# Patient Record
Sex: FEMALE | Race: WHITE | Employment: UNEMPLOYED | URBAN - METROPOLITAN AREA
[De-identification: names, ages, dates, MRNs, and addresses within clinical notes are randomized per-mention and may not be internally consistent; named-entity substitution may affect disease eponyms.]

---

## 2021-04-06 ENCOUNTER — HOSPITAL ENCOUNTER (INPATIENT)
Age: 36
LOS: 4 days | Discharge: HOME | DRG: 540 | End: 2021-04-10
Attending: OBSTETRICS & GYNECOLOGY | Admitting: OBSTETRICS & GYNECOLOGY
Payer: MEDICARE

## 2021-04-06 DIAGNOSIS — Z98.890 POST-OPERATIVE STATE: Primary | ICD-10-CM

## 2021-04-06 DIAGNOSIS — Z98.890 POST-OPERATIVE STATE: ICD-10-CM

## 2021-04-06 PROBLEM — O09.90 HIGH RISK PREGNANCY, ANTEPARTUM: Status: ACTIVE | Noted: 2021-04-06

## 2021-04-06 LAB
ABO/RH: NORMAL
ABSOLUTE EOS #: <0.03 K/UL (ref 0–0.44)
ABSOLUTE IMMATURE GRANULOCYTE: 0.41 K/UL (ref 0–0.3)
ABSOLUTE LYMPH #: 0.95 K/UL (ref 1.1–3.7)
ABSOLUTE MONO #: 0.15 K/UL (ref 0.1–1.2)
ANTIBODY SCREEN: NEGATIVE
ARM BAND NUMBER: NORMAL
BASOPHILS # BLD: 0 % (ref 0–2)
BASOPHILS ABSOLUTE: 0.04 K/UL (ref 0–0.2)
DIFFERENTIAL TYPE: ABNORMAL
EOSINOPHILS RELATIVE PERCENT: 0 % (ref 1–4)
EXPIRATION DATE: NORMAL
HCT VFR BLD CALC: 34 % (ref 36.3–47.1)
HEMOGLOBIN: 11.6 G/DL (ref 11.9–15.1)
IMMATURE GRANULOCYTES: 2 %
LYMPHOCYTES # BLD: 6 % (ref 24–43)
MAGNESIUM: 4.5 MG/DL (ref 1.6–2.6)
MAGNESIUM: 5.6 MG/DL (ref 1.6–2.6)
MCH RBC QN AUTO: 32.7 PG (ref 25.2–33.5)
MCHC RBC AUTO-ENTMCNC: 34.1 G/DL (ref 28.4–34.8)
MCV RBC AUTO: 95.8 FL (ref 82.6–102.9)
MONOCYTES # BLD: 1 % (ref 3–12)
NRBC AUTOMATED: 0 PER 100 WBC
PDW BLD-RTO: 12.5 % (ref 11.8–14.4)
PLATELET # BLD: 315 K/UL (ref 138–453)
PLATELET ESTIMATE: ABNORMAL
PMV BLD AUTO: 9.1 FL (ref 8.1–13.5)
RBC # BLD: 3.55 M/UL (ref 3.95–5.11)
RBC # BLD: ABNORMAL 10*6/UL
SEG NEUTROPHILS: 91 % (ref 36–65)
SEGMENTED NEUTROPHILS ABSOLUTE COUNT: 15.77 K/UL (ref 1.5–8.1)
T. PALLIDUM, IGG: NONREACTIVE
WBC # BLD: 17.3 K/UL (ref 3.5–11.3)
WBC # BLD: ABNORMAL 10*3/UL

## 2021-04-06 PROCEDURE — 86780 TREPONEMA PALLIDUM: CPT

## 2021-04-06 PROCEDURE — 6360000002 HC RX W HCPCS: Performed by: STUDENT IN AN ORGANIZED HEALTH CARE EDUCATION/TRAINING PROGRAM

## 2021-04-06 PROCEDURE — 86850 RBC ANTIBODY SCREEN: CPT

## 2021-04-06 PROCEDURE — 85025 COMPLETE CBC W/AUTO DIFF WBC: CPT

## 2021-04-06 PROCEDURE — 6370000000 HC RX 637 (ALT 250 FOR IP): Performed by: STUDENT IN AN ORGANIZED HEALTH CARE EDUCATION/TRAINING PROGRAM

## 2021-04-06 PROCEDURE — 86901 BLOOD TYPING SEROLOGIC RH(D): CPT

## 2021-04-06 PROCEDURE — 1220000000 HC SEMI PRIVATE OB R&B

## 2021-04-06 PROCEDURE — 2580000003 HC RX 258: Performed by: STUDENT IN AN ORGANIZED HEALTH CARE EDUCATION/TRAINING PROGRAM

## 2021-04-06 PROCEDURE — 86900 BLOOD TYPING SEROLOGIC ABO: CPT

## 2021-04-06 PROCEDURE — 83735 ASSAY OF MAGNESIUM: CPT

## 2021-04-06 PROCEDURE — 36415 COLL VENOUS BLD VENIPUNCTURE: CPT

## 2021-04-06 PROCEDURE — 6360000002 HC RX W HCPCS

## 2021-04-06 PROCEDURE — 81001 URINALYSIS AUTO W/SCOPE: CPT

## 2021-04-06 PROCEDURE — 99221 1ST HOSP IP/OBS SF/LOW 40: CPT | Performed by: OBSTETRICS & GYNECOLOGY

## 2021-04-06 RX ORDER — METOPROLOL SUCCINATE 25 MG/1
25 TABLET, EXTENDED RELEASE ORAL DAILY
COMMUNITY

## 2021-04-06 RX ORDER — AZITHROMYCIN 250 MG/1
500 TABLET, FILM COATED ORAL DAILY
Status: DISCONTINUED | OUTPATIENT
Start: 2021-04-08 | End: 2021-04-08

## 2021-04-06 RX ORDER — ONDANSETRON 2 MG/ML
4 INJECTION INTRAMUSCULAR; INTRAVENOUS EVERY 6 HOURS PRN
Status: DISCONTINUED | OUTPATIENT
Start: 2021-04-06 | End: 2021-04-08

## 2021-04-06 RX ORDER — AMOXICILLIN 250 MG/1
250 CAPSULE ORAL EVERY 8 HOURS SCHEDULED
Status: DISCONTINUED | OUTPATIENT
Start: 2021-04-08 | End: 2021-04-08

## 2021-04-06 RX ORDER — SODIUM CHLORIDE, SODIUM LACTATE, POTASSIUM CHLORIDE, CALCIUM CHLORIDE 600; 310; 30; 20 MG/100ML; MG/100ML; MG/100ML; MG/100ML
INJECTION, SOLUTION INTRAVENOUS CONTINUOUS
Status: DISCONTINUED | OUTPATIENT
Start: 2021-04-06 | End: 2021-04-08

## 2021-04-06 RX ORDER — AZITHROMYCIN 250 MG/1
500 TABLET, FILM COATED ORAL DAILY
Status: DISCONTINUED | OUTPATIENT
Start: 2021-04-08 | End: 2021-04-06

## 2021-04-06 RX ORDER — ACETAMINOPHEN 325 MG/1
650 TABLET ORAL EVERY 4 HOURS PRN
Status: DISCONTINUED | OUTPATIENT
Start: 2021-04-06 | End: 2021-04-08 | Stop reason: ALTCHOICE

## 2021-04-06 RX ORDER — AMOXICILLIN 250 MG/1
250 CAPSULE ORAL EVERY 8 HOURS SCHEDULED
Status: DISCONTINUED | OUTPATIENT
Start: 2021-04-08 | End: 2021-04-06

## 2021-04-06 RX ORDER — BETAMETHASONE SODIUM PHOSPHATE AND BETAMETHASONE ACETATE 3; 3 MG/ML; MG/ML
12 INJECTION, SUSPENSION INTRA-ARTICULAR; INTRALESIONAL; INTRAMUSCULAR; SOFT TISSUE ONCE
Status: COMPLETED | OUTPATIENT
Start: 2021-04-07 | End: 2021-04-07

## 2021-04-06 RX ORDER — LEVOTHYROXINE AND LIOTHYRONINE 19; 4.5 UG/1; UG/1
30 TABLET ORAL DAILY
COMMUNITY

## 2021-04-06 RX ORDER — PROMETHAZINE HYDROCHLORIDE 12.5 MG/1
12.5 TABLET ORAL EVERY 6 HOURS PRN
Status: DISCONTINUED | OUTPATIENT
Start: 2021-04-06 | End: 2021-04-08

## 2021-04-06 RX ORDER — VITAMIN A, ASCORBIC ACID, CHOLECALCIFEROL, .ALPHA.-TOCOPHEROL ACETATE, DL-, THIAMINE MONONITRATE, RIBOFLAVIN, NIACINAMIDE, PYRIDOXINE HYDROCHLORIDE, FOLIC ACID, CYANOCOBALAMIN, CALCIUM CARBONATE, IRON, ZINC OXIDE, AND CUPRIC OXIDE 4000; 120; 400; 22; 1.84; 3; 20; 10; 1; 12; 200; 29; 25; 2 [IU]/1; MG/1; [IU]/1; [IU]/1; MG/1; MG/1; MG/1; MG/1; MG/1; UG/1; MG/1; MG/1; MG/1; MG/1
1 TABLET ORAL DAILY
Status: DISCONTINUED | OUTPATIENT
Start: 2021-04-07 | End: 2021-04-08

## 2021-04-06 RX ORDER — LEVOTHYROXINE AND LIOTHYRONINE 19; 4.5 UG/1; UG/1
30 TABLET ORAL
Status: DISCONTINUED | OUTPATIENT
Start: 2021-04-07 | End: 2021-04-08 | Stop reason: SDUPTHER

## 2021-04-06 RX ADMIN — MAGNESIUM SULFATE HEPTAHYDRATE 2 G/HR: 40 INJECTION, SOLUTION INTRAVENOUS at 14:29

## 2021-04-06 RX ADMIN — MAGNESIUM SULFATE HEPTAHYDRATE 2 G/HR: 40 INJECTION, SOLUTION INTRAVENOUS at 23:52

## 2021-04-06 RX ADMIN — AMPICILLIN SODIUM 2000 MG: 2 INJECTION, POWDER, FOR SOLUTION INTRAMUSCULAR; INTRAVENOUS at 10:34

## 2021-04-06 RX ADMIN — METOPROLOL TARTRATE 25 MG: 25 TABLET ORAL at 11:11

## 2021-04-06 RX ADMIN — SODIUM CHLORIDE, POTASSIUM CHLORIDE, SODIUM LACTATE AND CALCIUM CHLORIDE: 600; 310; 30; 20 INJECTION, SOLUTION INTRAVENOUS at 10:40

## 2021-04-06 RX ADMIN — THYROID, PORCINE 30 MG: 30 TABLET ORAL at 10:27

## 2021-04-06 RX ADMIN — SODIUM CHLORIDE, POTASSIUM CHLORIDE, SODIUM LACTATE AND CALCIUM CHLORIDE: 600; 310; 30; 20 INJECTION, SOLUTION INTRAVENOUS at 23:51

## 2021-04-06 RX ADMIN — AMPICILLIN SODIUM 2000 MG: 2 INJECTION, POWDER, FOR SOLUTION INTRAMUSCULAR; INTRAVENOUS at 16:24

## 2021-04-06 RX ADMIN — AMPICILLIN SODIUM 2000 MG: 2 INJECTION, POWDER, FOR SOLUTION INTRAMUSCULAR; INTRAVENOUS at 23:51

## 2021-04-06 ASSESSMENT — PAIN DESCRIPTION - DESCRIPTORS: DESCRIPTORS: CRAMPING;TIGHTNESS

## 2021-04-06 NOTE — FLOWSHEET NOTE
Pt presents to [facility], accompanied by [employer] team, via stretcher. Pt here for transport from [facility] for [facility] at 31 4/7 wks. Pt verbalizes feeling ctx's about Q 10 mins. IV of LR infusing, Magnesium Sulfate at 2 gm/hr Pt in bed, oriented to room and call light. EFM explained and applied. FHT's 142. Dr. Gloria Guidry aware of admission.

## 2021-04-06 NOTE — DISCHARGE SUMMARY
Obstetric Discharge Summary  [facility]    Patient Name: Luciana Donohue  Patient : 1985  Primary Care Physician: No primary care provider on file. Admit Date: 2021     Principal Diagnosis: IUP at 31w4d, admitted for PPROM      Her pregnancy has been complicated by:   Patient Active Problem List   Diagnosis    High risk pregnancy, antepartum    S/p Celestone  &      premature rupture of membranes (PPROM) with unknown onset of labor    Oligohydramnios, antepartum    Multigravida of advanced maternal age in third trimester    Maternal cardiovascular disease complicating pregnancy in third trimester, antepartum    Tobacco use disorder complicating pregnancy, childbirth, or puerperium, antepartum    PLTCS 21 F Apg  Wt 3#15    Postpartum state       Infection Present?: No  Hospital Acquired: No    Surgical Operations & Procedures:  [] Pitocin Induction of Labor  [] Pitocin Augmentation of Labor  [] Prostaglandin Induction of Labor  [] Mechanical Induction of Labor  [] Artificial Rupture of Membranes  [] Intrauterine Pressure Catheter  [] Fetal Scalp Electrode  [] Amnioinfusion  Analgesia: spinal  Delivery Type:  Delivery: : Low Cervical, Transverse and See Labor and Delivery Summary   Laceration(s): Absent    Consultations: MFM, NICU and Anesthesia    Pertinent Findings & Procedures:   Luciana Donohue is a 28 y.o. female  at 27w4d admitted as a transfer from [facility] for PPROM.    21: She received Celestone and Magnesium Sulfate x24h for duration of Celestone course. IV latency antibiotics started (Ampicillin/Azithromycin IV -, and PO Amoxicillin/Azithromycin -).    21: Received second dose of Celestone. NICU was consulted and saw the patient. MFM ultrasound showed Cephalic, LEELEE 1.9 cm BPP 6/8 (fluid) EFW 3#12    21: Continued category 2 FHT remote from delivery.  Decision was made to proceed with control were reviewed. Signs and symptoms of mastitis and post partum depression were reviewed. The patient is to notify her physician if any of these occur. The patient was counseled on secondary smoke risks and the increased risk of sudden infant death syndrome and respiratory problems to her baby with exposure. She was counseled on various alternate recommendations to decrease the exposure to secondary smoke to her children.

## 2021-04-06 NOTE — PROGRESS NOTES
Resident Interval Magnesium Sulfate Note    Vnicent Stoll is a 28 y.o. female [id number] at 31w4d  The patient is resting comfortably. She endorses slight blurred vision but states she is exhausted. BP normotensive. She denies headache and abdominal pain in the right upper quadrant. She denies any shortness of breath or chest pain. She denies change in her extremities, regarding swelling. Continuous Medications:    [START ON 4/7/2021] magnesium sulfate 2 g/hr (04/06/21 1429)    lactated ringers 75 mL/hr at 04/06/21 1040       Vitals:    Vitals:    04/06/21 1556 04/06/21 1600 04/06/21 1700 04/06/21 1800   BP:  (!) 107/59 105/66 (!) 101/58   Pulse:  88 87 82   Resp: 18  16 15   Temp: 98.1 °F (36.7 °C)   97.5 °F (36.4 °C)   TempSrc: Oral   Oral   SpO2:  100% 99% 100%   Weight:       Height:           Physical Exam:  Chest: clear to auscultation bilaterally  Heart: RRR no murmur  Abdomen: soft, nontender, nondistended  Extremities: DTR normal Right: 2/4   Left: 2/4  Clonus: absent    Urine Output: 500/hr; Clear urine    Labs:  Last Magnesium Level:   Lab Results   Component Value Date    MG 4.5 04/06/2021       BMP:  No results for input(s): NA, K, CL, CO2, BUN, CREATININE, GLUCOSE in the last 72 hours. ASSESSMENT/PLAN  Vincent Stoll is a 28 y.o. female [id number] at 31w4d admitted for PPROM 4/6 @ 0230   - VSS.  BP normotensive   - S/p Celestone 4/6, next 4/7 at 0340   - Amp/Azithro (4/6-4/7), Amox/Azithro (4/8-4/12)   - Continue Magnesium Sulfate Treatment @ 2g/hr  - NICU consult - needs contacted   - CS consent done   - Mag checks Q 4 hours   - Mag levels Q 6 hours   - Strict Is and Os   - SCDs   - COVID neg (4/6 @ HC- in chart)       Ca Pompa DO  Ob/Gyn Resident  4/6/2021, 6:29 PM

## 2021-04-06 NOTE — PROGRESS NOTES
Resident Interval Magnesium Sulfate Note    Sofy Dong is a 28 y.o. female [id number] at 31w4d  The patient is resting comfortably. She denies headache, visual changes and abdominal pain in the right upper quadrant. She denies any shortness of breath or chest pain. She denies change in her extremities, regarding swelling. Continuous Medications:    [START ON 4/7/2021] magnesium sulfate      lactated ringers 75 mL/hr at 04/06/21 1040       Vitals:    Vitals:    04/06/21 1106 04/06/21 1200 04/06/21 1300 04/06/21 1355   BP: 111/71 116/62 105/60    Pulse: 99 97 91    Resp: 17 18     Temp:  97.9 °F (36.6 °C)  97.5 °F (36.4 °C)   TempSrc:  Oral  Oral   SpO2:  97% 98%    Weight:       Height:             Fetal heart rate: Baseline Heart Rate: 140, moderate variability, accelerations: present, decelerations: occasional variable deceleration.  Tracing appropriate for gestational age     [de-identified]: no contractions    Physical Exam:  Chest: clear to auscultation bilaterally  Heart: RRR no murmur  Abdomen: soft, nontender, gravid, no s/s chorio or abruption  Extremities: DTR normal Right: 2/4   Left: 2/4  Clonus: 2 beats present bilaterally    Urine Output: 172 ml/hr; Clear and Yellow urine    Labs:  Last Magnesium Level:   Lab Results   Component Value Date    MG 4.5 04/06/2021         ASSESSMENT/PLAN  Sofy Dong is a 28 y.o. female [id number] at 27w4d, PPROM   - Continue Magnesium Sulfate Treatment 2g/hr, off @ 10 0340 4/7/21   - Mag levels q6hrs per provider   - VSS, afebrile   - EFM appropriate for gestational age   - UOP adequate      Ricardo Camacho,   Ob/Gyn Resident  4/6/2021, 2:08 PM

## 2021-04-06 NOTE — H&P
OBSTETRICAL HISTORY [facility]    Date: 2021       Time: 2:34 PM   Patient Name: Viviane Forrest     Patient : 1985  Room/Bed: 0702/0702-01    Admission Date/Time: 2021  9:05 AM      CC: PPROM      HPI: Viviane Forrest is a 28 y.o. [id number] s/p  x3 at [address] [facility] who presents as a transfer from [facility] to [facility]. Patient reports she woke up to a gush of fluid at 0230 today. She continued to leak clear fluid and also felt intermittent contractions. She went to L&D at [facility] [place] and was evaluated by medical staff. Documentation reports grossly ruptured but no nitrazine, ferning, or Amnisure available. SSE revealed cervix dilated to 1cm. She received Celestone x1, Terbutaline x1 and was started on Magnesium Sulfate. GBS was collected and latency abx, Ampicillin and Azithromycin were started. Decision was made to transfer to [facility] for the NICU. The patient reports fetal movement is present, is feeling rare  contractions, complains of loss of fluid, denies vaginal bleeding. Patient follows with Rica Cotto CNM at [facility]. for prenatal care. Pregnancy is complicated by Hx of sPTD x3 (G1 @ 32wks, G2 @ 36wks, G6 @ 36wks, has been on vaginal progesterone nightly), tachycardia (currently on metoprolol 25 mg daily), hypothyroidism (currently on [place] Thyroid 30 mg daily). DATING:  LMP: No LMP recorded. Patient is pregnant.   Estimated Date of Delivery: 21   Based on: LMP, ultrasound at [address]:  Patient Active Problem List   Diagnosis    High risk pregnancy, antepartum        Steroids Given In This Pregnancy:  yes, date:      REVIEW OF SYSTEMS:   Constitutional: negative fever, negative chills, negative weight changes   HEENT: negative visual disturbances, negative headaches, negative dizziness, negative hearing loss  Breast: Negative breast abnormalities, negative breast lumps, negative nipple discharge Amniotic Fluid Index/Volume: adequate 2x2 cm fluid pocket  Estimated Fetal Weight:  3 lbs 7 oz      PRENATAL LAB RESULTS:   Blood Type/Rh: O pos  Antibody Screen: negative  Hemoglobin, Hematocrit, Platelets: 58.1 / 60.5 / 343  Rubella: immune  T.  Pallidum, IgG: non-reactive   Hepatitis B Surface Antigen: non-reactive   HIV: non-reactive   Sickle Cell Screen: not available  Gonorrhea: negative  Chlamydia: negative  Urine culture: not available    1 hour Glucose Tolerance Test: 117    Group B Strep: collected at [facility] [place], will follow up results  Cystic Fibrosis Screen: not available  First Trimester Screen: not available  MSAFP/Multiple Markers: not available  Non-Invasive Prenatal Testing: not available  Anatomy US: fundal placenta, 3 vc with normal insertion, normal female anatomy            ASSESSMENT & PLAN:  Abdullahi Albarado is a 28 y.o. female  at 27w4d IUP admitted for PPROM (clear) @ 0230 on    - Admit to labor and delivery, service of Dr. Anali Ledesma    - GBS unknown / Rh positive / R immune   - GBS collected at [facility], result rquested   - CBC, T&S, Tpal ordered    - UDS negative at [address] [facility] negative at [facility], result requested   - Continue latency antibiotic Amp/Azithro IV x48h, followed by Amox/Azithro PO x5 days    - S/p Celestone x1 on  @ 0338, repeat dose in 24h    - Continue Magnesium sulfate until completion of Celestone course    - S/p Terbutaline x1 @ [facility]   - Afebrile, VSS, no s/s of chorio or abruption   - cEFM/toco, Cat 1 FHT, no contractions    - SSE: 1cm dilated at [facility]    - NICU consulted    - CS consented signed and on chart    - Will obtain MFM scan after completion of Celestone/Magnesium     Hx sPTD x3    - G1 PPROM @ 32 weeks    - G2 @ 36 weeks    - G3 @ 36 weeks    - Previously on vaginal progesterone nightly   - Most recent CL 4.0 cm on 3/18/21     Tachycardia   - Diagnosed pre-pregnancy    - Holter monitoring predominantly normal, no significant arrhythmias   - Most recent Echo 1/29/21: LVEF 55%, normal valve structure   - Follows with Dr. Avi Huizar   - Currently on Metoprolol 25 mg daily     Hypothyroidism    - Diagnosed 2017    - Currently on [place] Thyroid 30 mg daily    - Most recent TSH 3/18/21 was 3.87, Free T4 0.61    AMA    - Genetic testing not available, will obtain Prenatal records    BMI 21    Patient Active Problem List    Diagnosis Date Noted    High risk pregnancy, antepartum 04/06/2021       Plan discussed with Dr. Alison Mayen, who is agreeable. Steroids given this admission: Yes    Risks, benefits, alternatives and possible complications have been discussed in detail with the patient. Admission, and post admission procedures and expectations were discussed in detail. All questions were answered.     Attending's Name: Dr. Libia Vogel DO  Ob/Gyn Resident  4/6/2021, 2:34 PM

## 2021-04-07 PROBLEM — O09.893: Status: ACTIVE | Noted: 2021-04-07

## 2021-04-07 LAB
-: NORMAL
AMORPHOUS: NORMAL
BACTERIA: NORMAL
BILIRUBIN URINE: NEGATIVE
CASTS UA: NORMAL /LPF (ref 0–8)
COLOR: YELLOW
COMMENT UA: ABNORMAL
CRYSTALS, UA: NORMAL /HPF
EPITHELIAL CELLS UA: NORMAL /HPF (ref 0–5)
GLUCOSE URINE: NEGATIVE
KETONES, URINE: NEGATIVE
LEUKOCYTE ESTERASE, URINE: NEGATIVE
MUCUS: NORMAL
NITRITE, URINE: NEGATIVE
OTHER OBSERVATIONS UA: NORMAL
PH UA: 7 (ref 5–8)
PROTEIN UA: NEGATIVE
RBC UA: NORMAL /HPF (ref 0–4)
RENAL EPITHELIAL, UA: NORMAL /HPF
SPECIFIC GRAVITY UA: 1.01 (ref 1–1.03)
TRICHOMONAS: NORMAL
TURBIDITY: CLEAR
URINE HGB: ABNORMAL
UROBILINOGEN, URINE: NORMAL
WBC UA: NORMAL /HPF (ref 0–5)
YEAST: NORMAL

## 2021-04-07 PROCEDURE — 6370000000 HC RX 637 (ALT 250 FOR IP): Performed by: STUDENT IN AN ORGANIZED HEALTH CARE EDUCATION/TRAINING PROGRAM

## 2021-04-07 PROCEDURE — 99252 IP/OBS CONSLTJ NEW/EST SF 35: CPT | Performed by: PEDIATRICS

## 2021-04-07 PROCEDURE — 76820 UMBILICAL ARTERY ECHO: CPT | Performed by: OBSTETRICS & GYNECOLOGY

## 2021-04-07 PROCEDURE — 76811 OB US DETAILED SNGL FETUS: CPT | Performed by: OBSTETRICS & GYNECOLOGY

## 2021-04-07 PROCEDURE — 99253 IP/OBS CNSLTJ NEW/EST LOW 45: CPT | Performed by: OBSTETRICS & GYNECOLOGY

## 2021-04-07 PROCEDURE — 76821 MIDDLE CEREBRAL ARTERY ECHO: CPT | Performed by: OBSTETRICS & GYNECOLOGY

## 2021-04-07 PROCEDURE — 2580000003 HC RX 258: Performed by: STUDENT IN AN ORGANIZED HEALTH CARE EDUCATION/TRAINING PROGRAM

## 2021-04-07 PROCEDURE — 96372 THER/PROPH/DIAG INJ SC/IM: CPT

## 2021-04-07 PROCEDURE — 76819 FETAL BIOPHYS PROFIL W/O NST: CPT | Performed by: OBSTETRICS & GYNECOLOGY

## 2021-04-07 PROCEDURE — 1220000000 HC SEMI PRIVATE OB R&B

## 2021-04-07 PROCEDURE — 6360000002 HC RX W HCPCS: Performed by: STUDENT IN AN ORGANIZED HEALTH CARE EDUCATION/TRAINING PROGRAM

## 2021-04-07 RX ORDER — CALCIUM CARBONATE 200(500)MG
1000 TABLET,CHEWABLE ORAL DAILY PRN
Status: DISCONTINUED | OUTPATIENT
Start: 2021-04-07 | End: 2021-04-10 | Stop reason: HOSPADM

## 2021-04-07 RX ADMIN — Medication 1 TABLET: at 10:05

## 2021-04-07 RX ADMIN — AMPICILLIN SODIUM 2000 MG: 2 INJECTION, POWDER, FOR SOLUTION INTRAMUSCULAR; INTRAVENOUS at 06:20

## 2021-04-07 RX ADMIN — THYROID, PORCINE 30 MG: 30 TABLET ORAL at 10:04

## 2021-04-07 RX ADMIN — ANTACID TABLETS 1000 MG: 500 TABLET, CHEWABLE ORAL at 22:23

## 2021-04-07 RX ADMIN — BETAMETHASONE SODIUM PHOSPHATE AND BETAMETHASONE ACETATE 12 MG: 3; 3 INJECTION, SUSPENSION INTRA-ARTICULAR; INTRALESIONAL; INTRAMUSCULAR; SOFT TISSUE at 04:29

## 2021-04-07 RX ADMIN — AMPICILLIN SODIUM 2000 MG: 2 INJECTION, POWDER, FOR SOLUTION INTRAMUSCULAR; INTRAVENOUS at 14:03

## 2021-04-07 RX ADMIN — METOPROLOL TARTRATE 25 MG: 25 TABLET ORAL at 10:04

## 2021-04-07 RX ADMIN — AMPICILLIN SODIUM 2000 MG: 2 INJECTION, POWDER, FOR SOLUTION INTRAMUSCULAR; INTRAVENOUS at 19:00

## 2021-04-07 NOTE — CARE COORDINATION
Initial Discharge Planning Note:     28 y.o. [id number] s/p  x3 at [address] who presents as a transfer from [facility] due to [facility]. SSE revealed cervix dilated to 1cm. She received Celestone x1, Terbutaline x1 and was started on Magnesium Sulfate. GBS was collected and latency abx, Ampicillin and Azithromycin were started. Transferred to [facility] for higher level of care. Patient follows with Sommer Osborn CNM at [facility]. for prenatal care. PLAN:   Admit to labor and delivery, service of Dr. Christen Gaines   Request GBS, Covid  result from [facility] T&S, Tpal ordered   Amp/Azithro IV x48h, followed by Amox/Azithro PO x5 days   Celestone dose in 24h   Continue Magnesium sulfate until completion of Celestone course   cEFM/toco, Cat 1 FHT, no contractions   CS consent signed and on chart   MFM scan after completion of Celestone/Magnesium   Vaginal progesterone nightly  Metoprolol 25 mg daily   [place] Thyroid 30 mg daily   Mag checks Q 4 hours  Mag levels Q 6 hours  Strict I/O, VS per unit protocol  SCDs    Anticipate patient will remain inpatient until delivery. Possible HC/DME at discharge    Case Management will continue to monitor throughout admission.

## 2021-04-07 NOTE — PROGRESS NOTES
OB/GYN PROGRESS NOTE    Mayda Pimentel is a 28 y.o. female [id number] at [facility] [address] Day: 2    Subjective:   Patient has been seen and examined. Patient is doing well with no complaints. Patient denies any vaginal discharge and any urinary complaints. The patient reports fetal movement is present, denies contractions, repots LOF all day yesterday but feels it has now slowed down, denies vaginal bleeding. Patient denies headache, vision changes, nausea, vomiting, fever, chills, shortness of breath, chest pain, RUQ pain, abdominal pain, diarrhea, change in color/amount/odor of vaginal discharge, dysuria or, hematuria. Patient was following with Valeri Albarran CNM this pregnancy. She has a significant obstetric history of PPROM in G1 folllowed by delivery @ 31 weeks, she reports she labored for 3 days before delivery. G2 and G3 she reports water broke at home and delivered \"pretty quick after that\" at 36 weeks in both pregnancies. Her oldest child has a different father, G2-4 pregnancies with current partner. Medical history is significant for tachycardia for which she takes metoprolol 25 mg QD and hypothyroidism for which she takes [place] thyroid 30 mg QD. Other than that she reports she is healthy. Patient's sister born with cleft pallatte. Other than that she reports no history of birth defects, learning disabilities or chromosomal abnormalities on either side of the family. Patient's dad has diabetes.      Objective:   Vitals:  Vitals:    04/07/21 0100 04/07/21 0200 04/07/21 0300 04/07/21 0400   BP: 108/66 (!) 106/55 (!) 105/55 101/63   Pulse: 91 86 90 89   Resp:  16 16 16   Temp:  98.2 °F (36.8 °C)  97.9 °F (36.6 °C)   TempSrc:  Oral  Oral   SpO2: 100% 99% 98% 100%   Weight:       Height:             FHT: 130, moderate variability, accelerations present, decelerations absent  Contractions: none    Physical Exam:  General appearance:  no apparent distress, alert and cooperative  HEENT: head atraumatic, normocephalic, moist mucous membranes, trachea midline  Neurologic:  alert, oriented, normal speech, no focal findings or movement disorder noted  Lungs:  No increased work of breathing, good air exchange, clear to auscultation bilaterally, no crackles or wheezing  Heart:  regular rate and rhythm and no murmur    Abdomen:  soft, gravid and non-tender  Extremities:  no calf tenderness, non edematous  Musculoskeletal: Gross strength equal and intact throughout, no gross abnormalities, range of motion normal in hips, knees, shoulders and spine  Psychiatric: Mood appropriate, normal affect   Rectal Exam: not indicated    Assessment/Plan:  Ania Macias is a 28 y.o. female [id number] at 35w11d PPROM clear fluid 4/6 @ 0230   - Rh +/ Rubella I/ GBS pending   - Currently on day 2 of IV latency antibiotics Ampicillin and azithromycin.  Will transition to PO x 5 days upon completion.   - Rhogam: not indicated    - Continue PNV, SCDs,  daily   - VSS   - Cat 1 FHT, TOCO quiet   - S/P Celestone x 2 4/6 and 4/7   - S/P magnesium sulfate x 24 hours   - S/P 1 dose of terbutaline prior to transfer    S/p Celestone 4/6 & 4/7    Leukocytosis (17.3)   - Afebrile   - VSS      Hx of PPROM (G1)    - Delivered at 32 weeks gestation       Hx sPTDx3 (G1 @ 32w, G2&3 @ 36w)   - Patient was compliant w/ vaginal progesterone this pregnancy    - Most recent CL was 4.0 cm on 3/18/21    Maternal Tachycardia    - Diagnosed prior to pregnancy   - Completed holter monitor which was WNL   - Most recent echo 1/29/21 LVEF 55% w/ normal valve structure   - Follow w/ Dr. Shira Perez   - Currently on metoprolol 25 mg daily    Hypothyroidism    - Stable on [place] Thryroid 30 mg daily     THC/Tobacco use (UDS neg @ [employer])    - Encourage cessation     AMA   -declines NIPT    BMI 21    Patient Active Problem List    Diagnosis Date Noted    S/p Celestone 4/6 & 4/7 04/07/2021     Overview Note:     PPROM 4/6      High risk pregnancy, antepartum 04/06/2021       Will update Dr. Anam Webb.      Sandra Redd DO  Ob/Gyn Resident  4/7/2021, 6:45 AM

## 2021-04-07 NOTE — PROGRESS NOTES
Resident Interval Magnesium Sulfate Note    Kwame Hall is a 28 y.o. female [id number] at 31w5d  The patient is resting comfortably. She denies headache, vision changes, and abdominal pain in the right upper quadrant. She denies any shortness of breath or chest pain. She denies change in her extremities, regarding swelling. Continuous Medications:    lactated ringers 75 mL/hr at 04/06/21 2351       Vitals:    Vitals:    04/06/21 2200 04/06/21 2300 04/07/21 0000 04/07/21 0100   BP: 111/66 106/63 105/61 108/66   Pulse: 86 82 86 91   Resp: 18 18 17    Temp: 98.1 °F (36.7 °C)  98.1 °F (36.7 °C)    TempSrc: Oral  Oral    SpO2: 100% 100% 100% 100%   Weight:       Height:           Physical Exam:  Chest: clear to auscultation bilaterally  Heart: RRR no murmur  Abdomen: soft, nontender, nondistended  Extremities: DTR normal Right: 2/4   Left: 2/4  Clonus: absent    Urine Output: 300/hr; Clear urine    Labs:  Last Magnesium Level:   Lab Results   Component Value Date    MG 5.6 04/06/2021       BMP:  No results for input(s): NA, K, CL, CO2, BUN, CREATININE, GLUCOSE in the last 72 hours. ASSESSMENT/PLAN  Kwame Hall is a 28 y.o. female [id number] at 31w5d admitted for PPROM 4/6 @ 0230   - VSS.  BP normotensive   - S/p Celestone 4/6, next 4/7 at 0340   - Amp/Azithro (4/6-4/7), Amox/Azithro (4/8-4/12)   - Continue Magnesium Sulfate Treatment @ 2g/hr  - NICU consult - contacted and will see patient in the AM  - CS consent done   - Mag checks Q 4 hours   - Mag levels Q 6 hours   - Strict Is and Os   - SCDs   - COVID neg (4/6 @ HC- in chart)       Pedro Luis Michaels,   Ob/Gyn Resident  4/7/2021, 2:05 AM

## 2021-04-07 NOTE — CONSULTS
Prenatal Consult      At the request of OB, I was asked to do a prenatal consult on Vincent Stoll  regarding premature birth and the associated  complications of a 31 5/7 weeks gestation delivery. Tyler Jean is a 27 y/o  transferred from [facility] for PPROM    PRENATAL LAB RESULTS:   Blood Type/Rh: O pos  Antibody Screen: negative  Hemoglobin, Hematocrit, Platelets: 66.7 / 46.6 / 343  Rubella: immune  T. Pallidum, IgG: non-reactive   Hepatitis B Surface Antigen: non-reactive   HIV: non-reactive   Sickle Cell Screen: not available  Gonorrhea: negative  Chlamydia: negative  Urine culture: not available     1 hour Glucose Tolerance Test: 117     Group B Strep: collected at [facility] [place], will follow up results  Cystic Fibrosis Screen: not available  First Trimester Screen: not available  MSAFP/Multiple Markers: not available  Non-Invasive Prenatal Testing: not available  Anatomy US: fundal placenta, 3 vc with normal insertion, normal female anatomy      Discussed with Tyler Jean and FOB the following issues  1. Survival rates: excellent  2. Fetal growth & development associated with 31+ weeks gestation  3. Respiratory related complications:  RDS-etiology & treatment including surfactant administration & modes of ventilation & oxygen administration   Apnea of prematurity, use of caffeine  4. Intraventricular Hemorrhage and future developmental risks   5. Infection & universal precautions  6. Hyperbilirubinemia  7. Patent ductus arteriosus, use of medication   8. ROP  9. Feeding & Growth issues: Mother's plan-discussed breast feeding and its benefits for the baby  Immaturity of suck-swallow-breath coordination   Indications for parenteral vs. enteral nutrition  Indications for fortification & supplementation of feeding  Monitoring growth  10. Thermoregulation issues  11. Anemia & blood transfusions  12. Immature renal function and low urine output  13.        Prolonged NICU stay and discharge criteria    Parent(s) given opportunity to ask questions. Verbalized understanding of premature birth and the associated  complications.      Time spent >40 min, > 50% spent in face to face counseling of the family and care management    Electronically signed by: Raenette Severe, MD 2021 9:34 AM

## 2021-04-07 NOTE — PROGRESS NOTES
Obstetric/Gynecology Maternal Fetal Medicine Resident Note    Patient seen and scanned at [facility] this morning. Ultrasound shows oligohydramnios w/ LEELEE 1.9 cm. Otherwise wnl. No evidence of cleft palate today. EFW 3#12. BPP 6/8 off for fluid. Will plan to scan weekly on Wednesdays until 34 weeks of gestation or sooner if indicated. Labor and delivery team updated.     DO DELFINA Zuniga Resident, PGY2  [facility]  4/7/2021, 9:35 AM

## 2021-04-08 ENCOUNTER — ANESTHESIA EVENT (OUTPATIENT)
Dept: LABOR AND DELIVERY | Age: 36
DRG: 540 | End: 2021-04-08
Payer: MEDICARE

## 2021-04-08 ENCOUNTER — ANESTHESIA (OUTPATIENT)
Dept: LABOR AND DELIVERY | Age: 36
DRG: 540 | End: 2021-04-08
Payer: MEDICARE

## 2021-04-08 VITALS — TEMPERATURE: 96.8 F | OXYGEN SATURATION: 100 % | DIASTOLIC BLOOD PRESSURE: 88 MMHG | SYSTOLIC BLOOD PRESSURE: 116 MMHG

## 2021-04-08 PROBLEM — Z98.890 POST-OPERATIVE STATE: Status: ACTIVE | Noted: 2021-04-08

## 2021-04-08 PROCEDURE — 6360000002 HC RX W HCPCS

## 2021-04-08 PROCEDURE — 3700000000 HC ANESTHESIA ATTENDED CARE: Performed by: OBSTETRICS & GYNECOLOGY

## 2021-04-08 PROCEDURE — 6370000000 HC RX 637 (ALT 250 FOR IP): Performed by: STUDENT IN AN ORGANIZED HEALTH CARE EDUCATION/TRAINING PROGRAM

## 2021-04-08 PROCEDURE — 6360000002 HC RX W HCPCS: Performed by: NURSE ANESTHETIST, CERTIFIED REGISTERED

## 2021-04-08 PROCEDURE — 2580000003 HC RX 258: Performed by: STUDENT IN AN ORGANIZED HEALTH CARE EDUCATION/TRAINING PROGRAM

## 2021-04-08 PROCEDURE — 1220000000 HC SEMI PRIVATE OB R&B

## 2021-04-08 PROCEDURE — 3609079900 HC CESAREAN SECTION: Performed by: OBSTETRICS & GYNECOLOGY

## 2021-04-08 PROCEDURE — 59514 CESAREAN DELIVERY ONLY: CPT | Performed by: OBSTETRICS & GYNECOLOGY

## 2021-04-08 PROCEDURE — 96374 THER/PROPH/DIAG INJ IV PUSH: CPT

## 2021-04-08 PROCEDURE — 6360000002 HC RX W HCPCS: Performed by: STUDENT IN AN ORGANIZED HEALTH CARE EDUCATION/TRAINING PROGRAM

## 2021-04-08 PROCEDURE — 88307 TISSUE EXAM BY PATHOLOGIST: CPT

## 2021-04-08 PROCEDURE — 7100000001 HC PACU RECOVERY - ADDTL 15 MIN: Performed by: OBSTETRICS & GYNECOLOGY

## 2021-04-08 PROCEDURE — 3700000001 HC ADD 15 MINUTES (ANESTHESIA): Performed by: OBSTETRICS & GYNECOLOGY

## 2021-04-08 PROCEDURE — 7100000000 HC PACU RECOVERY - FIRST 15 MIN: Performed by: OBSTETRICS & GYNECOLOGY

## 2021-04-08 PROCEDURE — 99232 SBSQ HOSP IP/OBS MODERATE 35: CPT | Performed by: OBSTETRICS & GYNECOLOGY

## 2021-04-08 PROCEDURE — 2709999900 HC NON-CHARGEABLE SUPPLY: Performed by: OBSTETRICS & GYNECOLOGY

## 2021-04-08 RX ORDER — FERROUS SULFATE 325(65) MG
325 TABLET ORAL 2 TIMES DAILY
Qty: 60 TABLET | Refills: 0 | Status: SHIPPED | OUTPATIENT
Start: 2021-04-08

## 2021-04-08 RX ORDER — NALOXONE HYDROCHLORIDE 0.4 MG/ML
0.4 INJECTION, SOLUTION INTRAMUSCULAR; INTRAVENOUS; SUBCUTANEOUS PRN
Status: DISCONTINUED | OUTPATIENT
Start: 2021-04-08 | End: 2021-04-10 | Stop reason: HOSPADM

## 2021-04-08 RX ORDER — SODIUM CHLORIDE 9 MG/ML
25 INJECTION, SOLUTION INTRAVENOUS PRN
Status: DISCONTINUED | OUTPATIENT
Start: 2021-04-08 | End: 2021-04-10 | Stop reason: HOSPADM

## 2021-04-08 RX ORDER — LEVOTHYROXINE AND LIOTHYRONINE 19; 4.5 UG/1; UG/1
30 TABLET ORAL DAILY
Status: DISCONTINUED | OUTPATIENT
Start: 2021-04-09 | End: 2021-04-10 | Stop reason: HOSPADM

## 2021-04-08 RX ORDER — ACETAMINOPHEN 325 MG/1
325 TABLET ORAL EVERY 4 HOURS PRN
Status: DISCONTINUED | OUTPATIENT
Start: 2021-04-08 | End: 2021-04-08

## 2021-04-08 RX ORDER — PHENYLEPHRINE HYDROCHLORIDE 10 MG/ML
INJECTION INTRAVENOUS PRN
Status: DISCONTINUED | OUTPATIENT
Start: 2021-04-08 | End: 2021-04-08 | Stop reason: SDUPTHER

## 2021-04-08 RX ORDER — NALBUPHINE HCL 10 MG/ML
5 AMPUL (ML) INJECTION
Status: CANCELLED | OUTPATIENT
Start: 2021-04-08

## 2021-04-08 RX ORDER — SIMETHICONE 80 MG
80 TABLET,CHEWABLE ORAL EVERY 6 HOURS PRN
Status: DISCONTINUED | OUTPATIENT
Start: 2021-04-08 | End: 2021-04-10 | Stop reason: HOSPADM

## 2021-04-08 RX ORDER — IBUPROFEN 800 MG/1
800 TABLET ORAL EVERY 8 HOURS PRN
Status: DISCONTINUED | OUTPATIENT
Start: 2021-04-09 | End: 2021-04-10 | Stop reason: HOSPADM

## 2021-04-08 RX ORDER — OXYCODONE HYDROCHLORIDE AND ACETAMINOPHEN 5; 325 MG/1; MG/1
1 TABLET ORAL EVERY 6 HOURS PRN
Qty: 20 TABLET | Refills: 0 | Status: SHIPPED | OUTPATIENT
Start: 2021-04-08 | End: 2021-04-09 | Stop reason: HOSPADM

## 2021-04-08 RX ORDER — IBUPROFEN 600 MG/1
600 TABLET ORAL EVERY 6 HOURS PRN
Qty: 30 TABLET | Refills: 1 | Status: SHIPPED | OUTPATIENT
Start: 2021-04-08 | End: 2021-05-08

## 2021-04-08 RX ORDER — SODIUM CHLORIDE, SODIUM LACTATE, POTASSIUM CHLORIDE, CALCIUM CHLORIDE 600; 310; 30; 20 MG/100ML; MG/100ML; MG/100ML; MG/100ML
INJECTION, SOLUTION INTRAVENOUS CONTINUOUS
Status: ACTIVE | OUTPATIENT
Start: 2021-04-08 | End: 2021-04-09

## 2021-04-08 RX ORDER — LANOLIN 72 %
OINTMENT (GRAM) TOPICAL
Status: DISCONTINUED | OUTPATIENT
Start: 2021-04-08 | End: 2021-04-10 | Stop reason: HOSPADM

## 2021-04-08 RX ORDER — OXYCODONE HYDROCHLORIDE AND ACETAMINOPHEN 5; 325 MG/1; MG/1
1 TABLET ORAL EVERY 4 HOURS PRN
Status: DISCONTINUED | OUTPATIENT
Start: 2021-04-09 | End: 2021-04-09

## 2021-04-08 RX ORDER — MAGNESIUM SULFATE HEPTAHYDRATE 40 MG/ML
INJECTION, SOLUTION INTRAVENOUS
Status: COMPLETED
Start: 2021-04-08 | End: 2021-04-08

## 2021-04-08 RX ORDER — DOCUSATE SODIUM 100 MG/1
100 CAPSULE, LIQUID FILLED ORAL 2 TIMES DAILY
Status: DISCONTINUED | OUTPATIENT
Start: 2021-04-08 | End: 2021-04-10 | Stop reason: HOSPADM

## 2021-04-08 RX ORDER — DOCUSATE SODIUM 100 MG/1
100 CAPSULE, LIQUID FILLED ORAL 2 TIMES DAILY
Qty: 60 CAPSULE | Refills: 0 | Status: SHIPPED | OUTPATIENT
Start: 2021-04-08 | End: 2021-05-08

## 2021-04-08 RX ORDER — METOPROLOL SUCCINATE 25 MG/1
25 TABLET, EXTENDED RELEASE ORAL DAILY
Status: DISCONTINUED | OUTPATIENT
Start: 2021-04-09 | End: 2021-04-10 | Stop reason: HOSPADM

## 2021-04-08 RX ORDER — KETOROLAC TROMETHAMINE 30 MG/ML
30 INJECTION, SOLUTION INTRAMUSCULAR; INTRAVENOUS EVERY 6 HOURS
Status: COMPLETED | OUTPATIENT
Start: 2021-04-08 | End: 2021-04-09

## 2021-04-08 RX ORDER — ONDANSETRON 2 MG/ML
4 INJECTION INTRAMUSCULAR; INTRAVENOUS EVERY 6 HOURS PRN
Status: DISCONTINUED | OUTPATIENT
Start: 2021-04-08 | End: 2021-04-10 | Stop reason: HOSPADM

## 2021-04-08 RX ORDER — SODIUM CHLORIDE 0.9 % (FLUSH) 0.9 %
10 SYRINGE (ML) INJECTION PRN
Status: DISCONTINUED | OUTPATIENT
Start: 2021-04-08 | End: 2021-04-10 | Stop reason: HOSPADM

## 2021-04-08 RX ORDER — DIPHENHYDRAMINE HYDROCHLORIDE 50 MG/ML
25 INJECTION INTRAMUSCULAR; INTRAVENOUS EVERY 6 HOURS PRN
Status: DISCONTINUED | OUTPATIENT
Start: 2021-04-08 | End: 2021-04-10 | Stop reason: HOSPADM

## 2021-04-08 RX ORDER — OXYCODONE HYDROCHLORIDE AND ACETAMINOPHEN 5; 325 MG/1; MG/1
2 TABLET ORAL EVERY 4 HOURS PRN
Status: DISCONTINUED | OUTPATIENT
Start: 2021-04-09 | End: 2021-04-09

## 2021-04-08 RX ORDER — BISACODYL 10 MG
10 SUPPOSITORY, RECTAL RECTAL DAILY PRN
Status: DISCONTINUED | OUTPATIENT
Start: 2021-04-08 | End: 2021-04-10 | Stop reason: HOSPADM

## 2021-04-08 RX ORDER — ONDANSETRON 2 MG/ML
INJECTION INTRAMUSCULAR; INTRAVENOUS PRN
Status: DISCONTINUED | OUTPATIENT
Start: 2021-04-08 | End: 2021-04-08 | Stop reason: SDUPTHER

## 2021-04-08 RX ORDER — POLYETHYLENE GLYCOL 3350 17 G/17G
17 POWDER, FOR SOLUTION ORAL DAILY PRN
Status: DISCONTINUED | OUTPATIENT
Start: 2021-04-08 | End: 2021-04-10 | Stop reason: HOSPADM

## 2021-04-08 RX ORDER — VITAMIN A, ASCORBIC ACID, CHOLECALCIFEROL, .ALPHA.-TOCOPHEROL ACETATE, DL-, THIAMINE MONONITRATE, RIBOFLAVIN, NIACINAMIDE, PYRIDOXINE HYDROCHLORIDE, FOLIC ACID, CYANOCOBALAMIN, CALCIUM CARBONATE, IRON, ZINC OXIDE, AND CUPRIC OXIDE 4000; 120; 400; 22; 1.84; 3; 20; 10; 1; 12; 200; 29; 25; 2 [IU]/1; MG/1; [IU]/1; [IU]/1; MG/1; MG/1; MG/1; MG/1; MG/1; UG/1; MG/1; MG/1; MG/1; MG/1
1 TABLET ORAL DAILY
Status: DISCONTINUED | OUTPATIENT
Start: 2021-04-09 | End: 2021-04-10 | Stop reason: HOSPADM

## 2021-04-08 RX ORDER — TRISODIUM CITRATE DIHYDRATE AND CITRIC ACID MONOHYDRATE 500; 334 MG/5ML; MG/5ML
30 SOLUTION ORAL ONCE
Status: COMPLETED | OUTPATIENT
Start: 2021-04-08 | End: 2021-04-08

## 2021-04-08 RX ORDER — NALOXONE HYDROCHLORIDE 0.4 MG/ML
0.4 INJECTION, SOLUTION INTRAMUSCULAR; INTRAVENOUS; SUBCUTANEOUS PRN
Status: CANCELLED | OUTPATIENT
Start: 2021-04-08

## 2021-04-08 RX ORDER — ACETAMINOPHEN 500 MG
1000 TABLET ORAL EVERY 6 HOURS PRN
Status: DISCONTINUED | OUTPATIENT
Start: 2021-04-08 | End: 2021-04-10 | Stop reason: HOSPADM

## 2021-04-08 RX ORDER — MORPHINE SULFATE 1 MG/ML
INJECTION, SOLUTION EPIDURAL; INTRATHECAL; INTRAVENOUS PRN
Status: DISCONTINUED | OUTPATIENT
Start: 2021-04-08 | End: 2021-04-08 | Stop reason: SDUPTHER

## 2021-04-08 RX ADMIN — SODIUM CHLORIDE, POTASSIUM CHLORIDE, SODIUM LACTATE AND CALCIUM CHLORIDE: 600; 310; 30; 20 INJECTION, SOLUTION INTRAVENOUS at 01:53

## 2021-04-08 RX ADMIN — SODIUM CITRATE AND CITRIC ACID MONOHYDRATE 30 ML: 500; 334 SOLUTION ORAL at 20:51

## 2021-04-08 RX ADMIN — AZITHROMYCIN 500 MG: 250 TABLET, FILM COATED ORAL at 08:30

## 2021-04-08 RX ADMIN — THYROID, PORCINE 30 MG: 30 TABLET ORAL at 07:28

## 2021-04-08 RX ADMIN — PHENYLEPHRINE HYDROCHLORIDE 100 MCG: 10 INJECTION INTRAVENOUS at 21:18

## 2021-04-08 RX ADMIN — Medication 909 ML/HR: at 21:27

## 2021-04-08 RX ADMIN — ANTACID TABLETS 1000 MG: 500 TABLET, CHEWABLE ORAL at 10:06

## 2021-04-08 RX ADMIN — MAGNESIUM SULFATE IN WATER 4 G/HR: 40 INJECTION, SOLUTION INTRAVENOUS at 20:25

## 2021-04-08 RX ADMIN — AMOXICILLIN 250 MG: 250 CAPSULE ORAL at 14:08

## 2021-04-08 RX ADMIN — AMOXICILLIN 250 MG: 250 CAPSULE ORAL at 06:29

## 2021-04-08 RX ADMIN — SODIUM CHLORIDE, POTASSIUM CHLORIDE, SODIUM LACTATE AND CALCIUM CHLORIDE: 600; 310; 30; 20 INJECTION, SOLUTION INTRAVENOUS at 23:32

## 2021-04-08 RX ADMIN — AMPICILLIN SODIUM 2000 MG: 2 INJECTION, POWDER, FOR SOLUTION INTRAMUSCULAR; INTRAVENOUS at 00:13

## 2021-04-08 RX ADMIN — ONDANSETRON 4 MG: 2 INJECTION INTRAMUSCULAR; INTRAVENOUS at 21:27

## 2021-04-08 RX ADMIN — KETOROLAC TROMETHAMINE 30 MG: 30 INJECTION, SOLUTION INTRAMUSCULAR; INTRAVENOUS at 23:34

## 2021-04-08 RX ADMIN — DEXTROSE MONOHYDRATE 2000 MG: 50 INJECTION, SOLUTION INTRAVENOUS at 20:51

## 2021-04-08 RX ADMIN — Medication 1 TABLET: at 08:28

## 2021-04-08 RX ADMIN — SODIUM CHLORIDE, POTASSIUM CHLORIDE, SODIUM LACTATE AND CALCIUM CHLORIDE: 600; 310; 30; 20 INJECTION, SOLUTION INTRAVENOUS at 22:00

## 2021-04-08 RX ADMIN — Medication 500 MG: at 21:05

## 2021-04-08 RX ADMIN — MORPHINE SULFATE 0.2 MG: 1 INJECTION, SOLUTION EPIDURAL; INTRATHECAL; INTRAVENOUS at 21:10

## 2021-04-08 RX ADMIN — METOPROLOL TARTRATE 25 MG: 25 TABLET ORAL at 08:28

## 2021-04-08 ASSESSMENT — PULMONARY FUNCTION TESTS
PIF_VALUE: 1
PIF_VALUE: 0
PIF_VALUE: 32
PIF_VALUE: 0
PIF_VALUE: 33
PIF_VALUE: 0
PIF_VALUE: 33
PIF_VALUE: 30
PIF_VALUE: 0
PIF_VALUE: 1
PIF_VALUE: 0
PIF_VALUE: 0
PIF_VALUE: 12
PIF_VALUE: 0
PIF_VALUE: 3
PIF_VALUE: 0

## 2021-04-08 NOTE — PROGRESS NOTES
OB/GYN Resident Interval Note     FHT noted to have a prolonged decel from baseline of 125 to christi of 90 lasting 10 min with return of baseline to 170 bpm. Patient repositioned multiple times, IV fluid bolus started. Denies contractions, vaginal bleeding, reports minimal leakage of fluid. Dr. Scott Prost in to see patient. Continue to monitor. NPO for now. If FHT remains Cat 2 despite multiple resuscitation attempts will proceed with  delivery. Vitals:    21 1140 21 1405 21 1553 21 1627   BP: 115/71  98/60 123/67   Pulse: 82  68 83   Resp: 16  16 16   Temp: 98.1 °F (36.7 °C) 98.4 °F (36.9 °C) 98.3 °F (36.8 °C) 98.3 °F (36.8 °C)   TempSrc: Oral Oral Oral Oral   SpO2:       Weight:       Height:           No results found for this or any previous visit (from the past 12 hour(s)).       Ciara Rendon DO  Ob/Gyn Resident  Pager: [phone]  2021 5:25 PM

## 2021-04-08 NOTE — FLOWSHEET NOTE
Pt off unit at this time, verified paper was signed, blanket provided,  wheeled pt out. Changed bed sheets, removed trash and dirty linen, provided gown, towels, washcloths and soap for pt to shower.

## 2021-04-08 NOTE — CARE COORDINATION
Interview and Care Planning note:     Met with mom Mandy Hodge in room 702  Introduced self and role. Rakesh Barajas was a transfer from [facility] [place] due to [facility] at 31 4/7 weeks and will be staying until delivery. Her OB provider is Laz Salgado CNM  She has three boys at home and tells this CM she is having a girl. They would like to name her [facility]. Peds will be Dr Mykel Taylor. Rakesh Barajas states she had her last baby early. FOB name Harlee Schwab and he steps into the room at this time. Introduce self and role again. FOB has no questions at this time. Encouraged parents to reach out to case management if needs arise.       See yesterdays DCP for plan    CM will continue to monitor    Patient will be inpatient until delivery

## 2021-04-08 NOTE — PROGRESS NOTES
OBGYN Resident Progress Note    Francis Wan is a 28 y.o. female [id number] at [address] Day: 3    Subjective:   Patient has been seen and examined. Patient is resting comfortably. overall has no complaints. Patient denies any headache, visual changes, difficulty breathing, RUQ pain, N/V, F/C, and pain/swelling in lower extremities. Denies any dysuria or vaginal discharge. The patient reports fetal movement is present, denies contractions, complains of mild amount of leaking fluid, denies vaginal bleeding. Objective:   Vitals:  Vitals:    04/07/21 1615 04/07/21 1955 04/08/21 0017 04/08/21 0154   BP: 127/63 (!) 113/54 (!) 110/55    Pulse: 98 100 94    Resp: 18 16 16    Temp: 98.4 °F (36.9 °C) 98.5 °F (36.9 °C) 98.5 °F (36.9 °C) 98.3 °F (36.8 °C)   TempSrc: Oral Oral Oral Oral   SpO2:       Weight:       Height:         Fetal Heart Monitor:  Baseline Heart Rate 130, moderate variability, present accelerations, intermittent variable and late decelerations through out the evening. At 2026 there was a 3 min prolong deceleration with christi to 105 bpm , tones returned to baseline spontaneously.  Overall the tracing has been appropriate for GA  [place]: contractions, irregular, every 7-8 minutes    General appearance: no apparent distress, alert and cooperative  HEENT: head atraumatic, normocephalic, trachea midline, moist mucous membranes   Neurologic: alert, oriented, normal speech, no focal findings or movement disorder noted  Lungs: no increased work of breathing, good air exchange, clear to auscultation bilaterally, no crackles or wheezing  Heart: regular rate and rhythm and no murmur    Abdomen: soft, gravid, non-tender on palpation, no right upper quadrant tenderness, no CVA tenderness bilaterally, uterus non-tender, no signs of abruption and no signs of chorioamnionitis  Extremities:  no calf tenderness bilaterally, non-edematous bilaterally   Musculoskeletal: no gross abnormalities, range of motion appropriate for age   Psychiatric: mood appropriate, normal affect      DATA:  Labs:   Lab Results   Component Value Date    WBC 17.3 (H) 2021    HGB 11.6 (L) 2021    HCT 34.0 (L) 2021    MCV 95.8 2021     2021        Assessment/Plan:  Abdullahi Albarado is a 28 y.o. female [id number] at [place] [address]   - Rh+/RI/GBSpending    - Will request records for GBS status in a few days once swab results    - Pen G for prophylaxis if delivery is imminent    - COVID19 negative 21 (result in [facility] from [facility] [place])    PPROM ( @ 0230)    - VSS, afebrile, no signs of chorio    - cEFM and TOCO   - Mix of variable and late decelerations noted overnight but overall tracing reassuring    - TOCO is picking up contractions about every 7-8 min that patient is not appreciating. Holding off on digital checks unless clinical status changes    - MFM scans every Wednesday. MFM US : cephalic presentation, LEELEE 1.9 cm, EFW 3#12   - IVF: [email or web address]   - Diet: general    - PNV QD   - DVT prophylaxis: SCDs when not ambulating    - CS consent in chart    - NICU consult completed on     - CBC and T&S every 3 days, next draw on 21   - Leukocytosis noted on admission (17.3)   - S/p initial course of steroids on  and     - S/p IV latency antibiotics.  Starting PO amoxicillin and azithromycin today    - S/p 24 hrs of mag for neuro protection, will re start if delivery seems imminent     - Continue inpatient care until 34 weeks     Hypothyroidism    - [place] thyroid 30 mg QD   - Clinically asymptomatic     Tachycardia    - Follows with Dr Yola Shukla    - Metoprolol 25 mg QD    - HR 80-90s     Hx Spontaneous  Deliveries x3   - Was compliant with vaginal progesterone this pregnancy    - Most recent CL was 4.0 cm on 3/18/21    Tobacco Use    - Patient leaves the floor often to smoke outside     Patient Active Problem List    Diagnosis Date Noted    S/p Celestone  & 2021     Overview Note:

## 2021-04-09 LAB
ABSOLUTE EOS #: 0 K/UL (ref 0–0.44)
ABSOLUTE IMMATURE GRANULOCYTE: 0.42 K/UL (ref 0–0.3)
ABSOLUTE LYMPH #: 2.54 K/UL (ref 1.1–3.7)
ABSOLUTE MONO #: 1.7 K/UL (ref 0.1–1.2)
BASOPHILS # BLD: 0 % (ref 0–2)
BASOPHILS ABSOLUTE: 0 K/UL (ref 0–0.2)
DIFFERENTIAL TYPE: ABNORMAL
EOSINOPHILS RELATIVE PERCENT: 0 % (ref 1–4)
HCT VFR BLD CALC: 25.6 % (ref 36.3–47.1)
HCT VFR BLD CALC: 30.8 % (ref 36.3–47.1)
HEMOGLOBIN: 8.2 G/DL (ref 11.9–15.1)
HEMOGLOBIN: 9.8 G/DL (ref 11.9–15.1)
IMMATURE GRANULOCYTES: 2 %
LYMPHOCYTES # BLD: 12 % (ref 24–43)
MCH RBC QN AUTO: 32.4 PG (ref 25.2–33.5)
MCH RBC QN AUTO: 32.6 PG (ref 25.2–33.5)
MCHC RBC AUTO-ENTMCNC: 31.8 G/DL (ref 28.4–34.8)
MCHC RBC AUTO-ENTMCNC: 32 G/DL (ref 28.4–34.8)
MCV RBC AUTO: 101.2 FL (ref 82.6–102.9)
MCV RBC AUTO: 102.3 FL (ref 82.6–102.9)
MONOCYTES # BLD: 8 % (ref 3–12)
MORPHOLOGY: NORMAL
NRBC AUTOMATED: 0 PER 100 WBC
NRBC AUTOMATED: 0.1 PER 100 WBC
PDW BLD-RTO: 12.4 % (ref 11.8–14.4)
PDW BLD-RTO: 12.6 % (ref 11.8–14.4)
PLATELET # BLD: 254 K/UL (ref 138–453)
PLATELET # BLD: 320 K/UL (ref 138–453)
PLATELET ESTIMATE: ABNORMAL
PMV BLD AUTO: 9.6 FL (ref 8.1–13.5)
PMV BLD AUTO: 9.8 FL (ref 8.1–13.5)
RBC # BLD: 2.53 M/UL (ref 3.95–5.11)
RBC # BLD: 3.01 M/UL (ref 3.95–5.11)
RBC # BLD: ABNORMAL 10*6/UL
SEG NEUTROPHILS: 78 % (ref 36–65)
SEGMENTED NEUTROPHILS ABSOLUTE COUNT: 16.54 K/UL (ref 1.5–8.1)
TSH SERPL DL<=0.05 MIU/L-ACNC: 4.12 MIU/L (ref 0.3–5)
WBC # BLD: 21.2 K/UL (ref 3.5–11.3)
WBC # BLD: 22.6 K/UL (ref 3.5–11.3)
WBC # BLD: ABNORMAL 10*3/UL

## 2021-04-09 PROCEDURE — 99024 POSTOP FOLLOW-UP VISIT: CPT | Performed by: OBSTETRICS & GYNECOLOGY

## 2021-04-09 PROCEDURE — 85025 COMPLETE CBC W/AUTO DIFF WBC: CPT

## 2021-04-09 PROCEDURE — 36415 COLL VENOUS BLD VENIPUNCTURE: CPT

## 2021-04-09 PROCEDURE — 84443 ASSAY THYROID STIM HORMONE: CPT

## 2021-04-09 PROCEDURE — 6370000000 HC RX 637 (ALT 250 FOR IP): Performed by: STUDENT IN AN ORGANIZED HEALTH CARE EDUCATION/TRAINING PROGRAM

## 2021-04-09 PROCEDURE — 6360000002 HC RX W HCPCS: Performed by: STUDENT IN AN ORGANIZED HEALTH CARE EDUCATION/TRAINING PROGRAM

## 2021-04-09 PROCEDURE — 1220000000 HC SEMI PRIVATE OB R&B

## 2021-04-09 PROCEDURE — 85027 COMPLETE CBC AUTOMATED: CPT

## 2021-04-09 RX ORDER — HYDROCODONE BITARTRATE AND ACETAMINOPHEN 5; 325 MG/1; MG/1
1 TABLET ORAL EVERY 6 HOURS PRN
Status: DISCONTINUED | OUTPATIENT
Start: 2021-04-09 | End: 2021-04-09

## 2021-04-09 RX ORDER — HYDROCODONE BITARTRATE AND ACETAMINOPHEN 5; 325 MG/1; MG/1
2 TABLET ORAL EVERY 4 HOURS PRN
Status: DISCONTINUED | OUTPATIENT
Start: 2021-04-09 | End: 2021-04-10 | Stop reason: HOSPADM

## 2021-04-09 RX ORDER — HYDROCODONE BITARTRATE AND ACETAMINOPHEN 5; 325 MG/1; MG/1
1 TABLET ORAL EVERY 6 HOURS PRN
Qty: 20 TABLET | Refills: 0 | Status: SHIPPED | OUTPATIENT
Start: 2021-04-09 | End: 2021-04-16

## 2021-04-09 RX ORDER — HYDROCODONE BITARTRATE AND ACETAMINOPHEN 5; 325 MG/1; MG/1
1 TABLET ORAL EVERY 4 HOURS PRN
Status: DISCONTINUED | OUTPATIENT
Start: 2021-04-09 | End: 2021-04-10 | Stop reason: HOSPADM

## 2021-04-09 RX ADMIN — DOCUSATE SODIUM 100 MG: 100 CAPSULE, LIQUID FILLED ORAL at 23:08

## 2021-04-09 RX ADMIN — THYROID, PORCINE 30 MG: 30 TABLET ORAL at 11:30

## 2021-04-09 RX ADMIN — Medication 1 TABLET: at 08:59

## 2021-04-09 RX ADMIN — METOPROLOL SUCCINATE 25 MG: 25 TABLET, FILM COATED, EXTENDED RELEASE ORAL at 08:58

## 2021-04-09 RX ADMIN — DIPHENHYDRAMINE HYDROCHLORIDE 25 MG: 50 INJECTION, SOLUTION INTRAMUSCULAR; INTRAVENOUS at 01:11

## 2021-04-09 RX ADMIN — IBUPROFEN 800 MG: 800 TABLET, FILM COATED ORAL at 23:08

## 2021-04-09 RX ADMIN — KETOROLAC TROMETHAMINE 30 MG: 30 INJECTION, SOLUTION INTRAMUSCULAR; INTRAVENOUS at 14:39

## 2021-04-09 RX ADMIN — HYDROCODONE BITARTRATE AND ACETAMINOPHEN 1 TABLET: 5; 325 TABLET ORAL at 19:27

## 2021-04-09 RX ADMIN — KETOROLAC TROMETHAMINE 30 MG: 30 INJECTION, SOLUTION INTRAMUSCULAR; INTRAVENOUS at 05:48

## 2021-04-09 RX ADMIN — DOCUSATE SODIUM 100 MG: 100 CAPSULE, LIQUID FILLED ORAL at 08:58

## 2021-04-09 ASSESSMENT — PAIN SCALES - GENERAL
PAINLEVEL_OUTOF10: 4
PAINLEVEL_OUTOF10: 5
PAINLEVEL_OUTOF10: 5

## 2021-04-09 NOTE — FLOWSHEET NOTE
Patient transferred to room 735 per stretcher, report given to Tammy Khan RN at bedside and face to face. Fundus/bleeding assessed upon report.

## 2021-04-09 NOTE — L&D DELIVERY NOTE
Mother's Information    Labor Events     labor?: No     Mother Delivery Information    Episiotomy: None  Surgical or Additional Est. Blood Loss (mL): 0 (View Only): Edit in Flowsheets   Combined Est. Blood Loss (mL): 0        Shoshana Boyd [[id number]]    Labor Events     labor?: No   steroids?: Full Course  Cervical ripening date/time:     Rupture date/time:     Rupture type: Premature=PROM  Fluid color: Clear  Fluid odor: None          Anesthesia    Method: Spinal     Assisted Delivery Details    Forceps attempted?: No  Vacuum extractor attempted?: No     Document Additional Attempt       Document Additional Attempt             Shoulder Dystocia    Shoulder dystocia present?: No  Add Second Maneuver  Add Third Maneuver  Add Fourth Maneuver  Add Fifth Maneuver  Add Sixth Maneuver  Add Seventh Maneuver  Add Eighth Maneuver  Add Ninth Maneuver     Brule Presentation    Presentation: Vertex      Information    Head delivery date/time: 2021 21:24:00   Changing the 's delivery date/time could affect patient care.:    Delivery date/time:  21   Delivery type: , Low Transverse    Details:  Trial of labor?: No    categorization: Primary    priority: Non-scheduled   Indications for : Other   Skin Incision Type: Pfannenstiel   Uterine Incision: Low Transverse         Delivery Providers    Delivering clinician: Sia Stafford, DO   Provider Role    Tavares Álvarez, DO Resident    Gagan Salazar, DO Resident    Ricarda Schwartz, RN Registered Nurse    Virgil Frederick APRN - CNP Nurse Practitioner    Caron Francis, MADELEINE Registered Nurse      Cord    Vessels: 3 Vessels  Complications: Nuchal  Nuchal intervention: reduced  Delayed cord clamping?: Yes  Cord clamped date/time: 2021  Cord blood disposition: Lab  Gases sent?: Yes  Stem cell collection (by provider):  No     Placenta    Date/time: 2021 21:27:00  Removal: Manual Removal  Appearance: Intact  Disposition: Lab, Pathology     Delivery Resuscitation    Method: Bulb Suction, Stimulation, CPAP     Apgars    Living status: Living  Apgars   1 Minute:  5 Minute:  10 Minute 15 Minute 20 Minute   Skin Color: 0  1       Heart Rate: 2  2       Reflex Irritability: 2  2       Muscle Tone: 2  2       Respiratory Effort: 2  2       Total: 8  9               Apgars Assigned By: NICU     Skin to Skin    Skin to skin initiation date/time:     Skin to skin end date/time:     Reason skin to skin not initiated:  Acuity     [place] Measurements    Weight: 1790 g Length: 42 cm   Head circumference: 29.5 cm Chest circumference: 27 cm   Abdominal girth: 26.5 cm       Delivery Information    Episiotomy: None  Surgical or additional est. blood loss (mL): 0 (View Only): Edit in Flowsheets   Combined est. blood loss (mL): 0     Vaginal Delivery Counts     4x4:  Needles:  Instruments:  Lap Pads:  Sponges:    Initial counts:         Final counts:         Final vaginal sweep completed: Yes     Other Procedures    Procedures: None     Labor Length    3rd stage: 0h 03m

## 2021-04-09 NOTE — CARE COORDINATION
POST-PARTUM  DISCHARGE PLANNING/CARE COORDINATION    High risk pregnancy, antepartum [O09.90]    Writer met w/ Avtar Lees at bedside to discuss DCP. Avtar Lees is S/P CS  on 4/8/2021    Infant name on BC: [address]    Infant to NICU for prematurity 31 6/7 weeks. Infant PCP Dr Katerine Avendaño. FOB: Minal Perez    Writer verified name/address/phone number correct on facesheet    [place] advantage insurance correct. Writer notified patient she has 30 days from date of birth to add [address] to insurance policy. Avtar Lees verbalized understanding and will call [facility]. Avtar Lees verbalized has/have all necessary items for [address]. No previous home care or dme. No Home Care or DME anticipated. Anticipate DC of mother 4/12/2021. CM continue to follow for any DC needs. OB: Megan Clark CNM. May need to f/u with [facility] at 1 week.

## 2021-04-09 NOTE — CONSULTS
Reviewed education with pt. Pt has been pumping, reviewed frequency and duration, initiation phase. Pt verbalized understanding. Encouraged pt to call insurance and have physician sign pump request form.

## 2021-04-09 NOTE — PROGRESS NOTES
Obstetric/Gynecology Resident Interval Note    Patient's labs reviewed below. Hgb 8.2. Will repeat H&H this afternoon. Patient is asymptomatic with light lochia. Continue to monitor.  PO iron on discharge     Vitals:    04/08/21 2355 04/09/21 0010 04/09/21 0100 04/09/21 0341   BP: 123/66 123/73 120/70 (!) 108/59   Pulse: 79 77 99 62   Resp: 19 16 17 16   Temp:  98.4 °F (36.9 °C) 98.1 °F (36.7 °C) 98 °F (36.7 °C)   TempSrc:  Oral Oral Oral   SpO2:  100% 100% 99%   Weight:       Height:         Recent Results (from the past 12 hour(s))   CBC auto differential    Collection Time: 04/09/21  5:18 AM   Result Value Ref Range    WBC 21.2 (H) 3.5 - 11.3 k/uL    RBC 2.53 (L) 3.95 - 5.11 m/uL    Hemoglobin 8.2 (L) 11.9 - 15.1 g/dL    Hematocrit 25.6 (L) 36.3 - 47.1 %    .2 82.6 - 102.9 fL    MCH 32.4 25.2 - 33.5 pg    MCHC 32.0 28.4 - 34.8 g/dL    RDW 12.4 11.8 - 14.4 %    Platelets 076 988 - 742 k/uL    MPV 9.6 8.1 - 13.5 fL    NRBC Automated 0.1 (H) 0.0 per 100 WBC    Differential Type NOT REPORTED     Seg Neutrophils PENDING %    Lymphocytes PENDING %    Monocytes PENDING %    Eosinophils % PENDING %    Basophils PENDING %    Immature Granulocytes PENDING 0 %    Segs Absolute PENDING k/uL    Absolute Lymph # PENDING k/uL    Absolute Mono # PENDING k/uL    Absolute Eos # PENDING k/uL    Basophils Absolute PENDING 0.0 - 0.2 k/uL    Absolute Immature Granulocyte PENDING 0.00 - 0.30 k/uL    WBC Morphology NOT REPORTED     RBC Morphology NOT REPORTED     Platelet Estimate NOT DO Michelle  OB/GYN Resident, PGY2  [facility]  4/9/2021, 6:06 AM

## 2021-04-09 NOTE — ANESTHESIA POSTPROCEDURE EVALUATION
POST- ANESTHESIA EVALUATION       Pt Name: Dulce Madrid  MRN: [id number]  [place]: 1985  Date of evaluation: 2021  Time:  12:15 AM      /73   Pulse 77   Temp 99.1 °F (37.3 °C) (Tympanic)   Resp 16   Ht 5' 1\" (1.549 m)   Wt 112 lb (50.8 kg)   SpO2 100%   BMI 21.16 kg/m²      Consciousness Level  Awake  Cardiopulmonary Status  Stable  Pain Adequately Treated YES  Nausea / Vomiting  NO  Adequate Hydration  YES  Anesthesia Related Complications NONE      Electronically signed by Carlie Johnson MD on 2021 at 12:15 AM       Department of Anesthesiology  Postprocedure Note    Patient: Dulce Madrid  MRN: [id number]  [place]: 1985  Date of evaluation: 2021  Time:  12:14 AM     Procedure Summary     Date: 21 Room / Location: [facility] OR [address] [place] [address]    Anesthesia Start:  Anesthesia Stop:     Procedure:  SECTION (N/A ) Diagnosis: (non reassuring fhts)    Surgeons: Torie Thornton DO Responsible Provider: Carlie Johnson MD    Anesthesia Type: spinal ASA Status: 2          Anesthesia Type: spinal    Omayra Phase I:      Omayra Phase II:      Last vitals: Reviewed and per EMR flowsheets.        Anesthesia Post Evaluation

## 2021-04-09 NOTE — BRIEF OP NOTE
Department of Obstetrics and Gynecology  Obstetrical Brief Operative Report  Reji Comer    Patient: Brian Johnson   : 1985  MRN: [id number]       Acct: [de-identified]   Date of Procedure: 21    Pre-operative Diagnosis: 28 y.o. female [id number] at [address]   PPROM 4/6 at 18   S/p Celestone 4/6 & 4/7   Leukocytosis (17.3)     Hx PPROM (G1)     Hx sPTDx3 (G1 @ 32w, G2&3 @ 36w)     Maternal Tachycardia     Mitral Regurgitation     Hypothyroidism    THC/Tobacco use (UDS neg @ HC)     AMA     BMI 21    Post-operative Diagnosis: Same as above  Viable Female Infant    Procedure: primary low transverse  section    Surgeon: Dr. Martha Riley DO  Assistant(s): Debbie Talley DO PGY3; Therese Perez DO, PGY2;    Anesthesia: spinal with Duramorph    Information for the patient's :  Susie Schmidt [[id number]]   female   Birth Weight: N/A     Information for the patient's :  Susie Schmidt [[id number]]          Findings:  Live Born female infant in cephalic presentation with Apgars of 8 at 1 minute and 9 at five minutes, normal appearing uterus tubes and ovaries   Quantitative Blood Loss: pending  Total IV Fluids: 500ml  Urine output: 400ml clear urine   Drains:  mishra catheter  Specimens:  placenta sent to pathology, cord blood and cord gases  Instrument and Sponge Count: Correct  Complications: none  Condition: Infant stable, transfer to Special Care Nursery, Mother stable, transfer to post anesthesia recovery    See dictated operative report for full details.     Therese Perez DO  Ob/Gyn Resident  2021, 10:38 PM

## 2021-04-09 NOTE — ANESTHESIA PRE PROCEDURE
Department of Anesthesiology  Preprocedure Note       Name:  Mayda Pimentel   Age:  28 y.o.  :  1985                                          MRN:  [id number]         Date:  2021      Surgeon: Anival Maguire):  Girish Hurley DO    Procedure: Procedure(s):   SECTION    Medications prior to admission:   Prior to Admission medications    Medication Sig Start Date End Date Taking?  Authorizing Provider   metoprolol succinate (TOPROL XL) 25 MG extended release tablet Take 25 mg by mouth daily   Yes Historical Provider, MD   thyroid (ARMOUR) 30 MG tablet Take 30 mg by mouth daily   Yes Historical Provider, MD   Prenatal Multivit-Min-Fe-FA (PRENATAL 1 + IRON PO) Take by mouth   Yes Historical Provider, MD       Current medications:    Current Facility-Administered Medications   Medication Dose Route Frequency Provider Last Rate Last Admin    ceFAZolin (ANCEF) 2000 mg in dextrose 5 % 50 mL IVPB  2,000 mg Intravenous Q8H Anita Chanel,  mL/hr at 21 2,000 mg at 21    azithromycin (ZITHROMAX) 500 mg in dextrose 5% 250 mL IVPB  500 mg Intravenous Once Moo Basia Cacciotti, DO        calcium carbonate (TUMS) chewable tablet 1,000 mg  1,000 mg Oral Daily PRN Reji Webb, DO   1,000 mg at 21 1006    promethazine (PHENERGAN) tablet 12.5 mg  12.5 mg Oral Q6H PRN Divya N Barhorst, DO        Or    ondansetron (ZOFRAN) injection 4 mg  4 mg Intravenous Q6H PRN Divya N Barhorst, DO        acetaminophen (TYLENOL) tablet 650 mg  650 mg Oral Q4H PRN Divya N Barhorst, DO        lactated ringers infusion   Intravenous Continuous Divya N Barhorst, DO 75 mL/hr at 21 0153 New Bag at 21 0153    thyroid (ARMOUR) tablet 30 mg  30 mg Oral QAM AC Divya N Barhorst, DO   30 mg at 21 0728    metoprolol tartrate (LOPRESSOR) tablet 25 mg  25 mg Oral Daily Divya N Barhorst, DO   25 mg at 21 0828    amoxicillin (AMOXIL) capsule 250 mg  250 mg Oral 3 times per day Zaida Patel, DO   250 mg at 21 1408    azithromycin (ZITHROMAX) tablet 500 mg  500 mg Oral Daily Divya Figueroa, DO   500 mg at 21 0830    prenatal vitamin plus iron 29-1 MG tablet 1 tablet  1 tablet Oral Daily Paulino Lopez, DO   1 tablet at 21 7540       Allergies:  No Known Allergies    Problem List:    Patient Active Problem List   Diagnosis Code    High risk pregnancy, antepartum O09.90    S/p Celestone  &  O09.893     premature rupture of membranes (PPROM) with unknown onset of labor O42.919    Oligohydramnios, antepartum O41.00X0    Multigravida of advanced maternal age in third trimester O09.523    Maternal cardiovascular disease complicating pregnancy in third trimester, antepartum O99.413    Tobacco use disorder complicating pregnancy, childbirth, or puerperium, antepartum O99.330       Past Medical History:        Diagnosis Date    Hashimoto's disease 2018    Heart abnormality     Mitral regurgitation 2019       Past Surgical History:        Procedure Laterality Date    WISDOM TOOTH EXTRACTION Left 2013       Social History:    Social History     Tobacco Use    Smoking status: Current Every Day Smoker     Packs/day: 0.50     Years: 10.00     Pack years: 5.00     Types: Cigarettes    Smokeless tobacco: Never Used   Substance Use Topics    Alcohol use: Not on file                                Ready to quit: Not Answered  Counseling given: Not Answered      Vital Signs (Current):   Vitals:    21 1405 21 1553 21 1627 21 1750   BP:  98/60 123/67    Pulse:  68 83    Resp:  16 16    Temp: 98.4 °F (36.9 °C) 98.3 °F (36.8 °C) 98.3 °F (36.8 °C) 98.1 °F (36.7 °C)   TempSrc: Oral Oral Oral Oral   SpO2:       Weight:       Height:                                                  BP Readings from Last 3 Encounters:   21 123/67       NPO Status: BMI:   Wt Readings from Last 3 Encounters:   04/06/21 112 lb (50.8 kg)     Body mass index is 21.16 kg/m². CBC:   Lab Results   Component Value Date    WBC 17.3 04/06/2021    RBC 3.55 04/06/2021    HGB 11.6 04/06/2021    HCT 34.0 04/06/2021    MCV 95.8 04/06/2021    RDW 12.5 04/06/2021     04/06/2021       CMP: No results found for: NA, K, CL, CO2, BUN, CREATININE, GFRAA, AGRATIO, LABGLOM, GLUCOSE, PROT, CALCIUM, BILITOT, ALKPHOS, AST, ALT    POC Tests: No results for input(s): POCGLU, POCNA, POCK, POCCL, POCBUN, POCHEMO, POCHCT in the last 72 hours. Coags: No results found for: PROTIME, INR, APTT    HCG (If Applicable): No results found for: PREGTESTUR, PREGSERUM, HCG, HCGQUANT     ABGs: No results found for: PHART, PO2ART, ZSS0NAZ, OBE3AWO, BEART, Z3SIUAYZ     Type & Screen (If Applicable):  No results found for: LABABO, LABRH    Drug/Infectious Status (If Applicable):  No results found for: HIV, HEPCAB    COVID-19 Screening (If Applicable): No results found for: COVID19        Anesthesia Evaluation  Patient summary reviewed and Nursing notes reviewed no history of anesthetic complications:   Airway: Mallampati: II  TM distance: >3 FB   Neck ROM: full  Mouth opening: > = 3 FB Dental:    (+) upper dentures      Pulmonary:Negative Pulmonary ROS and normal exam                               Cardiovascular:    (+) valvular problems/murmurs: MR,                   Neuro/Psych:   Negative Neuro/Psych ROS              GI/Hepatic/Renal: Neg GI/Hepatic/Renal ROS            Endo/Other: Negative Endo/Other ROS                    Abdominal:           Vascular:                                        Anesthesia Plan      spinal     ASA 2           MIPS: Postoperative opioids intended and Prophylactic antiemetics administered. Anesthetic plan and risks discussed with patient. Plan discussed with CRNA.                   Amie Ayala MD   4/8/2021

## 2021-04-09 NOTE — OP NOTE
Operative Note  Department of Obstetrics and Gynecology  [facility]     Patient: Tania Blevins   : 1985  MRN: [id number]       Acct: [de-identified]   PCP: No primary care provider on file. Date of Procedure: 21    Pre-operative Diagnosis: 28 y.o. female A8T8148 at [address]   PPROM  at 0230   Category 2 FHT unresponsive to intrauterine resuscitaiton  S/p Celestone  &    Leukocytosis (17.3)     Hx PPROM (G1)     Hx sPTDx3 (G1 @ 32w, G2&3 @ 36w)     Maternal Tachycardia     Mitral Regurgitation     Hypothyroidism    THC/Tobacco use (UDS neg @ HC)     AMA     BMI 21     Post-operative Diagnosis: Same as above  Viable Female Infant     Procedure: primary low transverse  section     Surgeon: Dr. Inez Dangelo DO  Assistant(s): Yin Otero DO PGY3; Anny Sweeney DO, PGY2;      Indications: Patient is a 31yo H1T8753 at [address] with PPROM. S/P Celestone. She had a category 2 tracing remote from delivery with recurrent prolonged and late decelerations that were not improving despite intrauterine resuscitation. She received ancef/azithro and mag for neuroprotection. Surgeon: Inez Dangelo   Assistants: Yin Otero PGY3 Anny Sweeney DO, PGY2;     Anesthesia: spinal with duramorph    Procedure Details   The patient was seen pre-operatively. The risks, benefits, complications, treatment options, and expected outcomes were discussed with the patient. The patient concurred with the proposed plan, giving informed consent. The patient was taken to the Operating Room, identified as Tania Blevins and the procedure verified as  Delivery. A Time Out was held and the above information confirmed. After spinal anesthesia, the patient was draped and prepped in the usual sterile manner. A Pfannenstiel incision was made and carried down through the subcutaneous tissue to the fascia using scalpel.  Fascial incision was made and extended transversely using blunt dissection. The fascia was  from the underlying rectus tissue superiorly and inferiorly using blunt dissection. The peritoneum was identified and entered bluntly. Peritoneal incision was extended longitudinally with blunt stretch, bladder retractor was placed. A low transverse uterine incision was made using a new scalpel blade. Blunt stretch on the hysterotomy incision was made and the amniotomy was performed revealing clear fluid. Delivered from cephalic presentation was a Live Born female infant who was placed in a bowel bag. The infant was suctioned, dried and the umbilical cord was clamped and cut after one minute delayed cord clamping. The infant was taken to the warmer and attended by NICU for evaluation. A second section of cord was clamped and cut and sent for gases. Cord blood was obtained for evaluation. The placenta was removed spontaneously with gentle traction and appeared intact and that the umbilical cord had three vessels noted. Pitocin was started. The uterine outline appeared normal. The uterus cleaned of all clots and debris. The uterine incision was closed with running locked sutures 0 vicryl. Several figure of eight sutures were needed in the left of the hysterotomy incision to obtain excellent hemostasis. Hemostasis was observed. Bilateral abdominal gutters were cleared of all clots and debris. Bilateral ovaries and tubes were visualized and appeared normal. The hysterotomy was again inspected and found to be hemostatic. Rectus muscles were inspected and found to be hemostatic. The fascia was then reapproximated with running sutures of 0 Vicryl. The subcuticular space was irrigated copiously. The skin was reapproximated with insorb staples. The skin was then cleansed and dressed with a bandage in sterile fashion. Instrument, sponge, and needle counts were correct prior the abdominal closure and at the conclusion of the case.  The urine remained clear throughout the case.  gentamycin and clindamycin were given for antibiotic prophylaxis. SCDs for DVT prophylaxis remain in place for the post operative period. Dr. Courtney Escobar was present for the entire operation. Findings:  Live Born female infant in cephalic presentation with Apgars of 8 at 1 minute and 9 at five minutes, normal appearing uterus tubes and ovaries   Quantitative Blood Loss: 395 ml  Total IV Fluids: 500ml  Urine output: 400ml clear urine   Drains:  mishra catheter  Specimens:  placenta sent to pathology, cord blood and cord gases  Instrument and Sponge Count: Correct  Complications: none  Condition: Infant stable, transfer to Special Care Nursery, Mother stable, transfer to post anesthesia recovery    Felipe Jaquez [place]  OB/GYN Resident  2021, 8:34 AM    This dictation was performed by a resident physician and then was thoroughly reviewed by the attending prior to being signed. Date: 2021  Time: 9:06 AM      Patient Name: Misty Cardenas  Patient : 1985  Room/Bed: 7002/2663-80  Admission Date/Time: 2021  9:05 AM        Attending Physician Statement  I have discussed the care of Misty Cardenas, including pertinent history and exam findings with the resident. I have reviewed and edited their note in the electronic medical record. The key elements of all parts of the encounter have been performed/reviewed by me . I agree with the assessment, plan and orders as documented by the resident. The level of care submitted represents to the best of my ability the care documented in the medical record today. GC Modifier. This service has been performed in part by a resident under the direction of a teaching physician.         Attending's Name:  Guicho Lloyd DO

## 2021-04-09 NOTE — PLAN OF CARE
Problem: Pain:  Goal: Pain level will decrease  Description: Pain level will decrease  Outcome: Met This Shift  Goal: Control of acute pain  Description: Control of acute pain  Outcome: Met This Shift  Goal: Control of chronic pain  Description: Control of chronic pain  Outcome: Met This Shift     Problem: Discharge Planning:  Goal: Discharged to appropriate level of care  Description: Discharged to appropriate level of care  Outcome: Met This Shift     Problem: Fluid Volume - Imbalance:  Goal: Absence of postpartum hemorrhage signs and symptoms  Description: Absence of postpartum hemorrhage signs and symptoms  Outcome: Met This Shift  Goal: Absence of imbalanced fluid volume signs and symptoms  Description: Absence of imbalanced fluid volume signs and symptoms  Outcome: Met This Shift     Problem: Infection - Surgical Site:  Goal: Will show no infection signs and symptoms  Description: Will show no infection signs and symptoms  Outcome: Met This Shift     Problem: Mood - Altered:  Goal: Mood stable  Description: Mood stable  Outcome: Met This Shift     Problem: Nausea/Vomiting:  Goal: Absence of nausea/vomiting  Description: Absence of nausea/vomiting  Outcome: Met This Shift     Problem: Urinary Retention:  Goal: Urinary elimination within specified parameters  Description: Urinary elimination within specified parameters  Outcome: Met This Shift     Problem: Venous Thromboembolism:  Goal: Will show no signs or symptoms of venous thromboembolism  Description: Will show no signs or symptoms of venous thromboembolism  Outcome: Met This Shift  Goal: Absence of signs or symptoms of impaired coagulation  Description: Absence of signs or symptoms of impaired coagulation  Outcome: Met This Shift

## 2021-04-09 NOTE — FLOWSHEET NOTE
Initiation of Electric Breast Pumping     Pumping Initiated at [facility] [place]    Initiated due to    [x]   Baby in NICU   []   Plans exclusive pumping   []   Infant weight loss(supplement)   []   Baby not latching well    Flange Size    Right:   Left:     [x]   24    [x]   24     []   27    []   27     []   30    []   30     []   36    []   36  Instructions   [x]   Verbal instructions on how to setup pump and how to use initiation phase   [x]   Demonstration on How to keep your breast pump kit clean   []   Expectation sheet for Breastfeeding mothers with pumping log   [x]   Frequency of pumping   [x]   Collection,labeling and storage of colostrum and milk    Supplies Provided   [x]   Pump initiation kit   [x]   Cleaning supplies (basin and soap)   []   Additional flange size   []   Oral syringes/snappies   [x]   Patient labels       -

## 2021-04-09 NOTE — PROGRESS NOTES
Obstetric/Gynecology Resident Interval Note    Patient seen and examined at bedside with Dr. Bal Wheatley. Due to continued category 2 fetal heart tracing decision was made to proceed with  section. Patient and partner are in agreement with plan of care. All questions answered.  consent has already been obtained. Antibiotics ordered. Mag bolus initiated. Anesthesia and NICU updated. Cierra Carranza DO  OB/GYN Resident, PGY2  [facility] [place]  2021, 8:13 PM      Date: 2021  Time: 10:33 PM      Patient Name: Francis Wan  Patient : 1985  Room[facility]Bed: [facility]  Admission Date/Time: 2021  9:05 AM        Attending Physician Statement  I have discussed the care of Francis Wan, including pertinent history and exam findings with the resident. I have reviewed and edited their note in the electronic medical record. The key elements of all parts of the encounter have been performed/reviewed by me . I agree with the assessment, plan and orders as documented by the resident. The level of care submitted represents to the best of my ability the care documented in the medical record today. GC Modifier. This service has been performed in part by a resident under the direction of a teaching physician. Patient seen and examined. Reviewed with patient tracing and spontaneous decelerations. Discussed need for proceeding to  section. R/B/A reviewed. Patient is agreeable with proceeding. NICU and anesthesia notified. Magnesium sulfate ordered for neuroprotection. Ancef and Lillie pre-op.      Attending's Name:  Leanna Spurling, DO

## 2021-04-09 NOTE — PLAN OF CARE
Problem: Healthcare acquired conditions:  Goal: Absence of healthcare acquired conditions  Description: Absence of healthcare acquired conditions  Outcome: Met This Shift

## 2021-04-09 NOTE — PROGRESS NOTES
POST OPERATIVE DAY # 1    Severo Lefevre is a 28 y.o. female   This patient was seen and examined today. PLTCS on 21    Her pregnancy was complicated by:   Patient Active Problem List   Diagnosis    High risk pregnancy, antepartum    S/p Celestone  &      premature rupture of membranes (PPROM) with unknown onset of labor    Oligohydramnios, antepartum    Multigravida of advanced maternal age in third trimester    Maternal cardiovascular disease complicating pregnancy in third trimester, antepartum    Tobacco use disorder complicating pregnancy, childbirth, or puerperium, antepartum    PLTCS 21 F Apg  Wt **       Today she is doing well without any chief complaint. Her lochia is light. She denies chest pain, shortness of breath, headache, lightheadedness, blurred vision and peripheral edema. She is breast pumping and she denies any signs or symptoms of mastitis. She has not tried ambulating yet. She has a mishra cath in place draining clear urine. She currently denies S/S of postpartum depression. Flatus absent. Bowel movement absent. She is tolerating solids.     Vital Signs:  Vitals:    21 2355 21 0010 21 0100 21 0341   BP: 123/66 123/73 120/70 (!) 108/59   Pulse: 79 77 99 62   Resp: 19 16 17 16   Temp:  98.4 °F (36.9 °C) 98.1 °F (36.7 °C) 98 °F (36.7 °C)   TempSrc:  Oral Oral Oral   SpO2:  100% 100% 99%   Weight:       Height:             Urine Input & Output last 24hrs:     Intake/Output Summary (Last 24 hours) at 2021 0448  Last data filed at 2021 0010  Gross per 24 hour   Intake 1662 ml   Output 1195 ml   Net 467 ml   urine out put 50 mL/hr    Physical Exam:  General:  no apparent distress, alert and cooperative  Neurologic:  alert, oriented, normal speech, no focal findings or movement disorder noted  Lungs:  No increased work of breathing, good air exchange, clear to auscultation bilaterally, no crackles or wheezing  Heart:  Regular rate and

## 2021-04-09 NOTE — PROGRESS NOTES
Obstetric/Gynecology Resident Interval Note    POD#1 labs reviewed. Hgb stable 8.2>9.8 and Leukocytosis stable 21.2>22. 6. Vitals:    04/09/21 0100 04/09/21 0341 04/09/21 0800 04/09/21 1136   BP: 120/70 (!) 108/59 113/85 108/67   Pulse: 99 62 74 90   Resp: 17 16 18 18   Temp: 98.1 °F (36.7 °C) 98 °F (36.7 °C) 98.6 °F (37 °C) 98.2 °F (36.8 °C)   TempSrc: Oral Oral Oral Oral   SpO2: 100% 99% 100%    Weight:       Height:            Recent Results (from the past 6 hour(s))   CBC    Collection Time: 04/09/21 11:20 AM   Result Value Ref Range    WBC 22.6 (H) 3.5 - 11.3 k/uL    RBC 3.01 (L) 3.95 - 5.11 m/uL    Hemoglobin 9.8 (L) 11.9 - 15.1 g/dL    Hematocrit 30.8 (L) 36.3 - 47.1 %    .3 82.6 - 102.9 fL    MCH 32.6 25.2 - 33.5 pg    MCHC 31.8 28.4 - 34.8 g/dL    RDW 12.6 11.8 - 14.4 %    Platelets 789 432 - 048 k/uL    MPV 9.8 8.1 - 13.5 fL    NRBC Automated 0.0 0.0 per 100 WBC      Vitals remain stable. Continue routine postop care and to monitor carefully.     Roel Cavanaugh DO  OB/GYN Resident, PGY2  [facility]  4/9/2021, 2:25 PM

## 2021-04-10 VITALS
DIASTOLIC BLOOD PRESSURE: 76 MMHG | HEART RATE: 112 BPM | BODY MASS INDEX: 21.14 KG/M2 | WEIGHT: 112 LBS | OXYGEN SATURATION: 99 % | RESPIRATION RATE: 16 BRPM | TEMPERATURE: 98.5 F | HEIGHT: 61 IN | SYSTOLIC BLOOD PRESSURE: 117 MMHG

## 2021-04-10 PROCEDURE — 6370000000 HC RX 637 (ALT 250 FOR IP): Performed by: STUDENT IN AN ORGANIZED HEALTH CARE EDUCATION/TRAINING PROGRAM

## 2021-04-10 RX ADMIN — METOPROLOL SUCCINATE 25 MG: 25 TABLET, FILM COATED, EXTENDED RELEASE ORAL at 09:45

## 2021-04-10 RX ADMIN — Medication 1 TABLET: at 09:47

## 2021-04-10 RX ADMIN — THYROID, PORCINE 30 MG: 30 TABLET ORAL at 09:45

## 2021-04-10 RX ADMIN — DOCUSATE SODIUM 100 MG: 100 CAPSULE, LIQUID FILLED ORAL at 09:45

## 2021-04-10 RX ADMIN — HYDROCODONE BITARTRATE AND ACETAMINOPHEN 1 TABLET: 5; 325 TABLET ORAL at 00:29

## 2021-04-10 RX ADMIN — IBUPROFEN 800 MG: 800 TABLET, FILM COATED ORAL at 09:47

## 2021-04-10 RX ADMIN — HYDROCODONE BITARTRATE AND ACETAMINOPHEN 1 TABLET: 5; 325 TABLET ORAL at 09:47

## 2021-04-10 ASSESSMENT — PAIN SCALES - GENERAL
PAINLEVEL_OUTOF10: 5
PAINLEVEL_OUTOF10: 4

## 2021-04-10 NOTE — PROGRESS NOTES
CLINICAL PHARMACY NOTE: MEDS TO [address] Select Patient?: No  Total # of Prescriptions Filled: 4   The following medications were delivered to the patient:  · norco 5/325mg tablet  · Motrin 600mg tablet  · Colace 100mg capsule  · Ferrous 325mg tablet  Total # of Interventions Completed: 0  Time Spent (min): 0    Additional Documentation: meds delivered to the pt in the pt room on 04.10.21

## 2021-04-10 NOTE — PROGRESS NOTES
POST OPERATIVE DAY # 2    Luciana Donohue is a 28 y.o. female   This patient was seen and examined today. Her pregnancy was complicated by:   Patient Active Problem List   Diagnosis    High risk pregnancy, antepartum    S/p Celestone  &      premature rupture of membranes (PPROM) with unknown onset of labor    Oligohydramnios, antepartum    Multigravida of advanced maternal age in third trimester    Maternal cardiovascular disease complicating pregnancy in third trimester, antepartum    Tobacco use disorder complicating pregnancy, childbirth, or puerperium, antepartum    PLTCS 21 F Apg 8/ Wt 3#15    Postpartum state     Today she is doing well without any chief complaint. Her lochia is light. She denies chest pain, shortness of breath, headache, lightheadedness, blurred vision, peripheral edema and palpitations. She is breast feeding/pumping and she denies any signs or symptoms of mastitis. She is ambulating well. She is voiding without difficulty. She currently denies S/S of postpartum depression. Flatus present. Bowel movement absent. She is tolerating solids. She desires discharge.      Vital Signs:  Vitals:    21 1136 21 1600 21 2000 04/10/21 0000   BP: 108/67 103/64 110/74 122/69   Pulse: 90 84 100 85   Resp: 18 16 18 (!) 85   Temp: 98.2 °F (36.8 °C) 98.2 °F (36.8 °C) 98.5 °F (36.9 °C) 98 °F (36.7 °C)   TempSrc: Oral Oral Oral Oral   SpO2:       Weight:       Height:         Urine Input & Output last 24hrs:     Intake/Output Summary (Last 24 hours) at 4/10/2021 0251  Last data filed at 2021 2258  Gross per 24 hour   Intake    Output 1800 ml   Net -1800 ml     Physical Exam:  General:  no apparent distress, alert and cooperative  Neurologic:  alert, oriented, normal speech, no focal findings or movement disorder noted  Lungs:  No increased work of breathing, good air exchange, clear to auscultation bilaterally, no crackles or wheezing  Heart:  regular rate

## 2021-04-10 NOTE — FLOWSHEET NOTE
I have reviewed all [facility] Post-Birth Warning Signs and essential teaching points for pulmonary embolism, cardiac disease, hypertensive disorders of pregnancy, obstetric hemorrhage, venous thromboembolism, infection, and postpartum depression with the patient and  . I have informed the patient on when to call their healthcare provider and when to call 911. I have discussed with the patient  the importance of scheduling a follow-up visit with their physician, nurse practitioner or midwife and provided them with correct contact information for appointment. I have provided the patient with a copy of the \"Save Your Life\" handout. The patient has acknowledged receiving and understanding this education with her signature.

## 2021-04-10 NOTE — LACTATION NOTE
Pt states she is not wanting to pump breastmilk anymore. Pt states, \"I never had enough for my other children and I don't want to continue. \" Offered support to pt if needed.

## 2021-04-13 LAB — SURGICAL PATHOLOGY REPORT: NORMAL

## 2021-05-09 PROBLEM — Z98.890 POST-OPERATIVE STATE: Status: RESOLVED | Noted: 2021-04-08 | Resolved: 2021-05-09

## 2022-10-14 LAB
ANION GAP SERPL CALCULATED.3IONS-SCNC: 8.3 MMOL/L
BACTERIA, URINE: ABNORMAL
BASOPHILS %: 1.08 (ref 0–3)
BASOPHILS ABSOLUTE: 0.09 (ref 0–0.3)
BILIRUBIN URINE: NEGATIVE
BLOOD, URINE: ABNORMAL
BUN BLDV-MCNC: 12 MG/DL (ref 7–17)
CALCIUM SERPL-MCNC: 10 MG/DL (ref 8.4–10.2)
CASTS UA: ABNORMAL
CHLORIDE BLD-SCNC: 104 MMOL/L (ref 98–120)
CLARITY: CLEAR
CO2: 28 MMOL/L (ref 22–31)
COLOR, URINE: YELLOW
CREAT SERPL-MCNC: 0.6 MG/DL (ref 0.5–1)
CRYSTALS, UA: ABNORMAL
EOSINOPHILS %: 1.39 (ref 0–10)
EOSINOPHILS ABSOLUTE: 0.12 (ref 0–1.1)
GFR CALCULATED: > 60
GLUCOSE URINE: NEGATIVE MG/DL
GLUCOSE: 76 MG/DL (ref 65–105)
HCT VFR BLD CALC: 49.7 % (ref 37–47)
HEMOGLOBIN: 15.1 (ref 12–16)
KETONES, URINE: NEGATIVE MG/DL
LEUKOCYTE ESTERASE, URINE: NEGATIVE
LYMPHOCYTE %: 23.31 (ref 20–51.1)
LYMPHOCYTES ABSOLUTE: 1.98 (ref 1–5.5)
MCH RBC QN AUTO: 29.9 PG (ref 28.5–32.5)
MCHC RBC AUTO-ENTMCNC: 30.4 G/DL (ref 32–37)
MCV RBC AUTO: 98.4 FL (ref 80–94)
MONOCYTES %: 4.99 (ref 1.7–9.3)
MONOCYTES ABSOLUTE: 0.42 (ref 0.1–1)
MUCUS, URINE: ABNORMAL
NEUTROPHILS %: 69.23 (ref 42.2–75.2)
NEUTROPHILS ABSOLUTE: 5.87 (ref 2–8.1)
NITRITE, URINE: NEGATIVE
PDW BLD-RTO: 11.2 % (ref 8.5–15.5)
PH UA: 8.5 (ref 5–8.5)
PLATELET # BLD: 394.6 THOU/MM3 (ref 130–400)
POTASSIUM SERPL-SCNC: 4.4 MMOL/L (ref 3.6–5)
PROTEIN UA: NEGATIVE MG/DL
RBC URINE: ABNORMAL (ref 0–2)
RBC: 5.05 M/UL (ref 4.2–5.4)
SODIUM BLD-SCNC: 140 MMOL/L (ref 135–145)
SPECIFIC GRAVITY, URINE: 1.02 MG/DL (ref 1–1.03)
SQUAMOUS EPITHELIAL: ABNORMAL
T4 FREE: 0.8 NG/DL (ref 0.78–2.19)
TRICHOMONAS, URINE: ABNORMAL
TSH SERPL DL<=0.05 MIU/L-ACNC: 7.2 MIU/ML (ref 0.49–4.67)
UROBILINOGEN, URINE: 0.2 MG/DL (ref 0.2–1)
VITAMIN D 25-HYDROXY: 36.6 NG/ML (ref 30–100)
WBC URINE: ABNORMAL (ref 0–4)
WBC: 8.5 THOU/ML3 (ref 4.8–10.8)
YEAST, URINE: ABNORMAL

## 2022-10-15 LAB — T3 FREE: 2.69 PG/ML (ref 2.02–4.43)

## 2022-10-19 LAB — HCG URINE: NEGATIVE

## (undated) DEVICE — TOWEL SURG W16XL26IN WHT NONFENESTRATED ST 2 PER PK

## (undated) DEVICE — SUTURE COAT VCRL SZ 0 L36IN ABSRB VLT CTX L48MM TAPERPOINT J370H

## (undated) DEVICE — KENDALL SCD EXPRESS SLEEVES, KNEE LENGTH, MEDIUM: Brand: KENDALL SCD

## (undated) DEVICE — SOLUTION IV IRRIG WATER 500ML POUR BRL ST 2F7113

## (undated) DEVICE — 3M™ STERI-STRIP™ ANTIMICROBIAL SKIN CLOSURES 1 IN X 5 IN, 25/CAR, 4 CAR/CASE A1848: Brand: 3M™ STERI-STRIP™

## (undated) DEVICE — SOLUTION SOD CHL 0.9% 1000ML

## (undated) DEVICE — SWAB MEDICATED TINC BENZ